# Patient Record
Sex: MALE | Race: WHITE | NOT HISPANIC OR LATINO | URBAN - METROPOLITAN AREA
[De-identification: names, ages, dates, MRNs, and addresses within clinical notes are randomized per-mention and may not be internally consistent; named-entity substitution may affect disease eponyms.]

---

## 2024-07-23 ENCOUNTER — CONSULT (OUTPATIENT)
Dept: ENDOCRINOLOGY | Facility: CLINIC | Age: 29
End: 2024-07-23
Payer: COMMERCIAL

## 2024-07-23 VITALS
BODY MASS INDEX: 28.2 KG/M2 | SYSTOLIC BLOOD PRESSURE: 135 MMHG | HEART RATE: 64 BPM | DIASTOLIC BLOOD PRESSURE: 86 MMHG | WEIGHT: 197 LBS | HEIGHT: 70 IN

## 2024-07-23 DIAGNOSIS — Z31.41 FERTILITY TESTING: ICD-10-CM

## 2024-07-23 DIAGNOSIS — Z11.3 ENCOUNTER FOR SCREENING EXAMINATION FOR SEXUALLY TRANSMITTED DISEASE: Primary | ICD-10-CM

## 2024-07-23 LAB
HBV SURFACE AG SERPL QL IA: NONREACTIVE
HCV AB SER QL: NONREACTIVE
HIV 1+2 AB+HIV1 P24 AG SERPL QL IA: NONREACTIVE
TREPONEMA PALLIDUM IGG+IGM AB [PRESENCE] IN SERUM OR PLASMA BY IMMUNOASSAY: NONREACTIVE

## 2024-07-23 PROCEDURE — 86780 TREPONEMA PALLIDUM: CPT

## 2024-07-23 PROCEDURE — 87591 N.GONORRHOEAE DNA AMP PROB: CPT

## 2024-07-23 PROCEDURE — 87491 CHLMYD TRACH DNA AMP PROBE: CPT

## 2024-07-23 PROCEDURE — 86803 HEPATITIS C AB TEST: CPT

## 2024-07-23 PROCEDURE — 87389 HIV-1 AG W/HIV-1&-2 AB AG IA: CPT

## 2024-07-23 PROCEDURE — 99202 OFFICE O/P NEW SF 15 MIN: CPT | Performed by: NURSE PRACTITIONER

## 2024-07-23 PROCEDURE — 99212 OFFICE O/P EST SF 10 MIN: CPT | Performed by: NURSE PRACTITIONER

## 2024-07-23 PROCEDURE — 87340 HEPATITIS B SURFACE AG IA: CPT

## 2024-07-23 PROCEDURE — 36415 COLL VENOUS BLD VENIPUNCTURE: CPT

## 2024-07-23 ASSESSMENT — PAIN SCALES - GENERAL: PAINLEVEL: 0-NO PAIN

## 2024-07-23 NOTE — PROGRESS NOTES
Visit Type: In Person    NEW FERTILITY PATIENT VISIT       Taras Wesley is a 29 y.o. here with partner Minerva for fertility assessment. They have been trying for 1 year.        Prior Labs  Lab Results    Date Done      AMH: No results found for requested labs within last 1825 days. No results found for requested labs within last 1825 days.   TSH: No results found for requested labs within last 1825 days. No results found for requested labs within last 1825 days.   PRL: No results found for requested labs within last 1825 days. No results found for requested labs within last 1825 days.   Testosterone: No results found for requested labs within last 1825 days. No results found for requested labs within last 1825 days.   DHEAS: No results found for requested labs within last 1825 days. No results found for requested labs within last 1825 days.   FSH: No results found for requested labs within last 1825 days. No results found for requested labs within last 1825 days.   17 OHP: No results found for requested labs within last 1825 days. No results found for requested labs within last 1825 days.   HgbA1c: No results found for requested labs within last 1825 days. No results found for requested labs within last 1825 days.   Hepatitis B surface antigen: No results found for requested labs within last 1825 days. No results found for requested labs within last 1825 days.   Hepatitis C antibody: No results found for requested labs within last 1825 days. No results found for requested labs within last 1825 days.   HIV ½ Antigen Antibody screen with reflex: No results found for requested labs within last 1825 days. No results found for requested labs within last 1825 days.   Syphilis screening with reflex: No results found for requested labs within last 1825 days. No results found for requested labs within last 1825 days.   GC: No results found for requested labs within last 1825 days. No results found for requested labs  within last 1825 days.   CT: No results found for requested labs within last 1825 days. No results found for requested labs within last 1825 days.   Type and Screen: No results found for requested labs within last 1825 days. No results found for requested labs within last 1825 days.   Rh: No results found for requested labs within last 1825 days. No results found for requested labs within last 1825 days.   Antibody: No results found for requested labs within last 1825 days. No results found for requested labs within last 1825 days.   Rubella: No results found for requested labs within last 1825 days. No results found for requested labs within last 1825 days.   Varicella: No results found for requested labs within last 1825 days. No results found for requested labs within last 1825 days.   Hemoglobin: No results found for requested labs within last 1825 days. No results found for requested labs within last 1825 days.   Hematocrit: No results found for requested labs within last 1825 days. No results found for requested labs within last 1825 days.   Creatinine: No results found for requested labs within last 1825 days. No results found for requested labs within last 1825 days.   AST:No results found for requested labs within last 1825 days. No results found for requested labs within last 1825 days.   ALT:No results found for requested labs within last 1825 days.: No results found for requested labs within last 1825 days.        PMH  Past Medical History:   Diagnosis Date    Concussion         MEDICATIONS  No current outpatient medications on file prior to visit.     No current facility-administered medications on file prior to visit.       PSH  Past Surgical History:   Procedure Laterality Date    WISDOM TOOTH EXTRACTION          Social History     Tobacco Use    Smoking status: Some Days     Types: Cigarettes     Passive exposure: Current    Smokeless tobacco: Never   Substance Use Topics    Alcohol use: Yes    Drug  "use: Yes     Types: Marijuana        PARTNER HISTORY    Partner Name: Partner name:: Taras Wesley    Partner : Partner :: [id number]    Partner email:    Occupation: Partner occupation:: Self employed    Prior fertility history: Partner Prior Fertility History:: None    PMH:   hx of concussions, kidney stones  PSH:  wisdom teeth  Smoking:Partner: Recent or current tobacco use: No    Alcohol Use: Partner: Recent or current alcohol use: Yes    Drug Use: Partner: Recent or current drug use: No    Medications: Partner Medications: None    Injuries: Partner: Any history of surgeries or injuries in the reproductive area?: No    STD: Have you ever been diagnosed with a sexually transmitted disease?: No    Please select all that are applicable:    SA: Prior Semen Analysis completed?: No    SA Results:  NA     FAMILY HISTORY   Family History   Problem Relation Name Age of Onset    Diabetes Maternal Grandmother      Epilepsy Other          half sister       BMI:   BMI Readings from Last 1 Encounters:   24 28.27 kg/m²     VITALS:  /86   Pulse 64   Ht 1.778 m (5' 10\")   Wt 89.4 kg (197 lb)   BMI 28.27 kg/m²   LMP: No LMP for male patient.    ASSESSMENT   29 y.o. here with partner for fertility assessment.     COUNSELING    INSTRUCTIONS FOR INFERTILITY TESTING    Semen Analysis  The semen samples that you have been asked to submit are important for diagnostic and therapeutic purposes.  Please abstain from ejaculating 2-3 days prior to collecting the sample.  The sample should be produced by masturbation.  You will need to collect the ejaculate into the container supplied by the [facility] (you can get containers at your doctor's office).  Do not use other plastic containers from home such as Tupperware as these containers as they may interfere with the test results.  Lubricants and saliva also interfere with test results.  If a collection condom is necessary, please request one at the [facility] lab " and they will provide you with an appropriate collection condom.  It is extremely important that the entire sample is collected in the container as the first few drops of ejaculate contain a major portion of sperm.  If you do not collect the entire specimen, please inform the technologist.      You can collect at home as long as you can get the sample to the [facility] lab within 1 hour of collecting.  Please bridget your name and time of collection on the container. You should carry the container close to your body.  Collection rooms are available at all locations as well.     An appointment is needed to ensure the lab has enough time to process your specimen.  Please call [phone] to schedule this appointment.       Routine Testing  Fertility Center  STDs Within 1 year   Genetic carrier Waiver/Completed   T&S Within 1 year   AMH Within 1 year   TSH Within 1 year   Rubella/Varicella Within 5 years     BMI Testing  Fertility Center  CBC Within 1 year   CMP Within 1 year   HgbA1c Within 1 year   Mag, Phos, Vit D <18 Within 1 year   MFM > 40  REQ   Wt loss consult > 40 OPT     PLAN  Orders Placed This Encounter   Procedures    Hepatitis B surface antigen    Hepatitis C antibody    HIV 1/2 Antigen/Antibody Screen with Reflex to Confirmation    Syphilis Screen with Reflex    C. Trachomatis / N. Gonorrhoeae, Amplified Detection    POCT Semen Analysis Complete with Strict Morphology       GENETIC SCREENING PATIENT  Waiver    PARTNER  Yes Semen Analysis: Ordered  Yes Genetic screening: Waiver    FOLLOW UP   Follow up with Ace Hensley  07/23/2024  9:30 AM

## 2024-07-24 LAB
C TRACH RRNA SPEC QL NAA+PROBE: NEGATIVE
N GONORRHOEA DNA SPEC QL PROBE+SIG AMP: NEGATIVE

## 2024-08-11 ENCOUNTER — HOSPITAL ENCOUNTER (OUTPATIENT)
Dept: RADIOLOGY | Facility: CLINIC | Age: 29
Discharge: HOME | End: 2024-08-11
Payer: COMMERCIAL

## 2024-08-11 DIAGNOSIS — S89.90XA KNEE INJURY, INITIAL ENCOUNTER: ICD-10-CM

## 2024-08-11 PROCEDURE — 73564 X-RAY EXAM KNEE 4 OR MORE: CPT | Mod: RIGHT SIDE | Performed by: RADIOLOGY

## 2024-08-11 PROCEDURE — 73564 X-RAY EXAM KNEE 4 OR MORE: CPT | Mod: RT

## 2024-08-13 ENCOUNTER — OFFICE VISIT (OUTPATIENT)
Dept: ORTHOPEDIC SURGERY | Facility: CLINIC | Age: 29
End: 2024-08-13
Payer: COMMERCIAL

## 2024-08-13 VITALS — WEIGHT: 197 LBS | BODY MASS INDEX: 28.2 KG/M2 | HEIGHT: 70 IN

## 2024-08-13 DIAGNOSIS — S89.91XA RIGHT KNEE INJURY, INITIAL ENCOUNTER: Primary | ICD-10-CM

## 2024-08-13 PROCEDURE — 3008F BODY MASS INDEX DOCD: CPT

## 2024-08-13 PROCEDURE — 99204 OFFICE O/P NEW MOD 45 MIN: CPT

## 2024-08-13 ASSESSMENT — PAIN - FUNCTIONAL ASSESSMENT: PAIN_FUNCTIONAL_ASSESSMENT: 0-10

## 2024-08-13 ASSESSMENT — PAIN SCALES - GENERAL: PAINLEVEL_OUTOF10: 4

## 2024-08-13 NOTE — PROGRESS NOTES
HPI  Taras Wesley is a 29 y.o. male  in office today for   Chief Complaint   Patient presents with    Right Knee - Edema, Pain     8/10/24 was doing a spin kick and landed on his knee it popped and he went down to the ground and was in immediate pain.   . Pain medial side and below patella.  he states that the knee feels very unstable with any lateral/medial movement.  OK straight ahead so long as he is wearing the brace he was given.  He has been limited in the amount of weight he can put on the knee.  He has been taking Tylenol/ibuprofen and icing for pain which are not helping at all.  He did see urgent care and they advised to follow up with ortho.      Medication  No current outpatient medications on file prior to visit.     No current facility-administered medications on file prior to visit.       Physical Exam  Constitutional: well developed, well nourished male in no acute distress  Psychiatric: normal mood, appropriate affect  Eyes: sclera anicteric  HENT: normocephalic/atraumatic  CV: regular rate and rhythm   Respiratory: non labored breathing  Integumentary: no rash  Neurological: moves all extremities    Right Knee Exam     Tenderness   The patient is experiencing tenderness in the medial joint line and patella.    Range of Motion   Extension:  -5   Flexion:  100     Tests   Moni:  Medial - positive Lateral - negative  Varus: negative Valgus: negative  Drawer:  Anterior - negative    Posterior - negative  Patellar apprehension: negative    Other   Erythema: absent  Scars: absent  Swelling: moderate  Effusion: effusion present              Imaging/Lab:  X-rays were taken 8/11/24 which were reviewed by myself and read by radiology and show No acute fracture or osseous displacement. Knee joint spaces are preserved. Moderate  suprapatellar effusion. Otherwise no focal soft tissue swelling is apparent.      Assessment  Assessment: right knee injury    Plan  Plan:  History, physical exam, and imaging  were reviewed with patient. Given PE, inability to bear weight, mechanism of injury ordering MRI to assess for meniscus and ACL injury.  MRI orders given.  In the meantime, should continue to keep weight off the knee (states he has crutches, but did not have in office), continue with RICE and antiinflammatories.  Follow Up: after MRI.  Will refer to Dr Alejandro as appropriate.    All questions were answered for the patient prior to end of exam and patient addressed their understanding.    Remedios Sales PA-C  08/13/24

## 2024-08-14 ENCOUNTER — HOSPITAL ENCOUNTER (OUTPATIENT)
Dept: RADIOLOGY | Facility: HOSPITAL | Age: 29
Discharge: HOME | End: 2024-08-14
Payer: COMMERCIAL

## 2024-08-14 DIAGNOSIS — S89.91XA RIGHT KNEE INJURY, INITIAL ENCOUNTER: ICD-10-CM

## 2024-08-14 PROCEDURE — 73721 MRI JNT OF LWR EXTRE W/O DYE: CPT | Mod: RT

## 2024-08-14 PROCEDURE — 73721 MRI JNT OF LWR EXTRE W/O DYE: CPT | Mod: RIGHT SIDE | Performed by: STUDENT IN AN ORGANIZED HEALTH CARE EDUCATION/TRAINING PROGRAM

## 2024-08-15 ENCOUNTER — TELEPHONE (OUTPATIENT)
Dept: ORTHOPEDIC SURGERY | Facility: CLINIC | Age: 29
End: 2024-08-15
Payer: COMMERCIAL

## 2024-08-15 NOTE — TELEPHONE ENCOUNTER
Spoke with patient regarding his MRI of his RT knee, he is referred to Dr Alejandro per miguelangel Sales PA-C

## 2024-08-19 ENCOUNTER — APPOINTMENT (OUTPATIENT)
Dept: ORTHOPEDIC SURGERY | Facility: CLINIC | Age: 29
End: 2024-08-19
Payer: COMMERCIAL

## 2024-08-19 VITALS — WEIGHT: 200 LBS | BODY MASS INDEX: 28.63 KG/M2 | HEIGHT: 70 IN

## 2024-08-19 DIAGNOSIS — S83.211A BUCKET HANDLE TEAR OF MEDIAL MENISCUS OF RIGHT KNEE, INITIAL ENCOUNTER: ICD-10-CM

## 2024-08-19 DIAGNOSIS — S83.241A ACUTE MEDIAL MENISCUS TEAR OF RIGHT KNEE, INITIAL ENCOUNTER: Primary | ICD-10-CM

## 2024-08-19 DIAGNOSIS — S89.91XA ACUTE INJURY OF ANTERIOR CRUCIATE LIGAMENT, RIGHT, INITIAL ENCOUNTER: ICD-10-CM

## 2024-08-19 PROCEDURE — 99215 OFFICE O/P EST HI 40 MIN: CPT | Performed by: ORTHOPAEDIC SURGERY

## 2024-08-19 PROCEDURE — 3008F BODY MASS INDEX DOCD: CPT | Performed by: ORTHOPAEDIC SURGERY

## 2024-08-19 ASSESSMENT — PAIN SCALES - GENERAL: PAINLEVEL_OUTOF10: 6

## 2024-08-19 ASSESSMENT — PAIN - FUNCTIONAL ASSESSMENT: PAIN_FUNCTIONAL_ASSESSMENT: 0-10

## 2024-08-19 NOTE — PROGRESS NOTES
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  The patient was doing a complex martial arts kick when he twisted the his old right knee and had severe pain.  He was seen by Remedios Sales ordered an MRI showing a significant injury of the right knee.  Patient complains of pain and instability in the right knee with an inability to extend the knee    Patients' self reported past medical history, medications, allergies, surgical history, family and social history as well as a 10 point review of systems has been documented in the new patient intake form and scanned into the patient's electronic medical record.  The intake form was reviewed by Dr Alejandro during the office visit and signed by Dr. Alejandro and the patient.  Pertinent findings are documented in the HPI.    General Multi-System Physical Exam:  Constitutional  General appearance:  Alert, oriented, and in no acute distress.  Well developed, well nourished.  Head and Face  Head and face:  Normocephalic and atraumatic.  Ears, Nose, Mouth, and Throat  External inspection of ears and nose: Normal.  Eyes:  Pupils are equal and round.  Neck  Neck:  no neck mass was observed.  Pulmonary  Respiratory effort:  no respiratory distress.  Cardiovascular  Intact distal pulses.  Lymphatic  Palpation of lymph nodes in the affected extremity:  Normal.  Skin  Skin and subcutaneous tissue:  Normal skin color and pigmentation.  Normal skin turgor.  No rashes.  Neurologic  Sensation:  normal to light touch.  Psychiatric  Judgement and insight:  Intact.  Mood and affect:  Normal.  Musculoskeletal  Right knee range of motion is from 20 degrees of flexion to 110 degrees of flexion with a mild effusion positive medial joint line tenderness negative lateral joint line tenderness positive Lachman positive anterior drawer patient guards her pivot shift testing knee  stable to varus and valgus stress negative posterior drawer neurovasc intact right lower extremity    Dr Alejandro personally reviewed the results of the x-rays that had been performed recently on this patient.    In addition, Dr Alejandro independently interpreted the patient's x-rays (performed by the Radiology department) by viewing the x-ray images and this is Dr. Alejandro's personal interpretation:     Normal x-rays right knee    Dr Alejandro independently interpreted the patient's MRI (performed by the Radiology department) by viewing the MRI images and this is Dr. Alejandro's personal interpretation:     Right knee with ACL rupture as well as medial meniscus bucket-handle tear displaced in the notch and posterior root medial meniscus tear        We had an extensive discussion regarding the patient's ACL rupture.  I explained to the patient that no one absolutely has to have their ACL reconstructed.  An ACL is not necessary for inline activities, for example walking, jogging, or going up and down stairs.  An ACL also is not necessary for casual recreational activities where a return-to-sport brace can be used which can help prevent subluxations of the knee.  However, studies have shown that bracing is not effective in aggressive cutting and pivoting sports.  I explained to the patient that if they wanted to return to aggressive cutting and pivoting sports, an ACL reconstruction is usually recommended before returning to this high level of activity.  We discussed the fact that if the patient's knee does continue to sublux, they have an increased risk of damaging the meniscus, cartilage and other structures in the knee.  However, no studies have been able to prove a decreased risk of long-term arthritis as a result of ACL reconstruction.  Whether the patient has an ACL reconstruction or not, they are at an increased risk of arthritis down the road, and this was explained to the patient at length.  We also discussed the graft  options including bone-tendon-bone autograft, hamstring autograft and allograft.  We discussed the benefits of the bone-tendon-bone being that it might heal in slightly faster; however, all grafts have been shown to be equivalent in strength in long-term followup studies.  I also explained to the patient that no matter what graft they select, we will not let them return back to aggressive cutting or pivoting sports in less than 6 or 7 months with a plan to start jogging at 3 months, running at 4 months, cutting and pivoting drills at 5 months and return to aggressive sports at 6 or 7 months.  We did discuss that bone-tendon-bone autograft have a longer incision over the front of the knee, so the patient has some abnormal sensations when they try kneeling on the knee for the rest of their life.  They may also have some numbness over the front of the knee from the incision.  In addition, there is an increased risk of anterior knee pain, which usually goes away, but it may take as long as 6 months to go away after bone-tendon-bone autograft.  With bone-tendon-bone autografts, there is also an increased risk of patellar fracture, although unlikely.  We discussed hamstring autografts and the fact that while the incision is smaller than a bone-tendon-bone autograft, it is larger than an allograft.  The incision is more over the anteromedial aspect of the knee.  Again, they may have some numbness around their incision after surgery, and they have some soreness in the hamstring muscles after surgery, which usually goes away with time, but can occasionally continue long after surgery.  Studies have shown no more than a 5%-10% loss in hamstring strength in long-term followup studies.  Lastly, we discussed the possibility of allograft for the ACL.  The benefits of the allograft are the smallest incision possible and no need to harvest any tissue from their leg, so the patient is most comfortable after surgery.  However, we did  discuss the risks of disease transmission including a risk of HIV at 1 in 1.5 million and risk of hepatitis C in approximately 1 in 150,000.  We also discussed the fact that there is a risk of disease transmission for diseases that are not yet diagnosed and, therefore, cannot be screened for.  We also discussed the fact that in patients under 25 years old, allografts are usually not recommended due to the high level of forces that the patients are going to put on their knee throughout their life.  With patients under the age of 25, we discussed how autografts are usually recommended.  However, it would be acceptable to use an allograft in someone under the age of 25 if they understood the risks vs. benefits.  We also discussed all of the risks vs. benefits of ACL reconstruction including but not limited to infection, bleeding, nerve, artery or muscle damage, possible need for additional surgery, possible need for removal of hardware, and possible continued pain or laxity in the knee.  Primary ACL reconstructions are approximately 90% effective at getting patients back to doing what they want to do.  However, some patients may require revisions or future surgeries.  The patient understood all of these risks and benefits of ACL reconstruction and all graft options.  I answered all the patient's questions.    We had a very long discussion regards to his right knee.  Due to his high level of activity and combat martial arts, the patient would like to proceed with a right knee ACL reconstruction with patellar tendon autograft with allograft backup medial meniscus repair if possible and debridement if not possible.  He would like to proceed with surgery on September 5 and signed appropriate surgical consents.  Will plan on seeing him back for surgery sooner if necessary        I, Dr. Alejandro, had a long discussion with the patient / patient's family regarding the risks versus benefits of surgery and all of the treatment  options, including nonoperative treatment and surgical treatment options. We discussed the risks of surgery.      The risks of surgery include, but are not limited to, nerve damage, artery damage, muscle damage, risk of bleeding or infection, risk of bone fracture, risk of post-operative stiffness, risk of failure of the surgery with possible continued pain or possible need for additional surgery.  Other risks include the risk of hardware pain and possible need for removal of the surgical hardware at another time.   The risks of undergoing anesthesia including, but are not limited to, stroke, heart attack or death.      After a long discussion, the patient / patient's family understood all of the risks versus benefits of surgery and decided that they wanted to proceed with surgery. The patient / guardian signed appropriate surgical consent forms.    Since this patient will be undergoing surgery, I expect the patient to be in significant additional pain in the immediate postoperative period as a result of the surgical procedure. Non-opioid pain medications will not be sufficient to treat this acute, sharp, postoperative pain. Therefore we will be prescribing the patient narcotic pain medications for the immediate postoperative period in addition to numerous non-narcotic pain medications in order to try to help the patient with their post-operative pain.  However, as the pain from surgery subsides, we will work diligently to wean the patient off of all narcotics as quickly as possible.   If the patient requires more narcotic pain medications than we typically see with patients undergoing this surgery, we will refer the patient to a pain management specialist within the first few weeks after their surgical procedure. The patient's OARRS report was reviewed within the last 90 days.        This patient has had a major injury to their affected extremity which has significantly traumatized it.  If surgery is not performed  to repair the extremity, it is unlikely the patient's extremity would ever function normally again.  Non-operative treatment would probably fail, leaving this patient with significant disability.  That would classify this problem as an acute injury that poses a threat to the function of the patient's extremity.     We discussed their surgery at great length.  This is an elective major surgery which is warranted in this case due to the patient's severely injured extremity.  We discussed identified patient and procedural risk factors and how these risk factors may increase the risk of the surgery or influence the outcome of the surgical procedure.   We also discussed how delaying surgery and treating this injury non-operatively may lead to a progression of the injury/disease and high risk of further morbidity.

## 2024-08-27 ENCOUNTER — TELEPHONE (OUTPATIENT)
Dept: ORTHOPEDIC SURGERY | Facility: CLINIC | Age: 29
End: 2024-08-27
Payer: COMMERCIAL

## 2024-08-29 ENCOUNTER — PRE-ADMISSION TESTING (OUTPATIENT)
Dept: PREADMISSION TESTING | Facility: HOSPITAL | Age: 29
End: 2024-08-29
Payer: COMMERCIAL

## 2024-08-29 VITALS
WEIGHT: 196.21 LBS | DIASTOLIC BLOOD PRESSURE: 89 MMHG | SYSTOLIC BLOOD PRESSURE: 144 MMHG | BODY MASS INDEX: 28.09 KG/M2 | OXYGEN SATURATION: 100 % | HEART RATE: 60 BPM | TEMPERATURE: 97.2 F | HEIGHT: 70 IN | RESPIRATION RATE: 18 BRPM

## 2024-08-29 DIAGNOSIS — S83.211A BUCKET HANDLE TEAR OF MEDIAL MENISCUS OF RIGHT KNEE, INITIAL ENCOUNTER: ICD-10-CM

## 2024-08-29 DIAGNOSIS — S83.241A ACUTE MEDIAL MENISCUS TEAR OF RIGHT KNEE, INITIAL ENCOUNTER: ICD-10-CM

## 2024-08-29 DIAGNOSIS — Z01.818 PREOPERATIVE EXAMINATION: Primary | ICD-10-CM

## 2024-08-29 DIAGNOSIS — S89.91XA ACUTE INJURY OF ANTERIOR CRUCIATE LIGAMENT, RIGHT, INITIAL ENCOUNTER: ICD-10-CM

## 2024-08-29 LAB
ALBUMIN SERPL BCP-MCNC: 4.3 G/DL (ref 3.4–5)
ALP SERPL-CCNC: 45 U/L (ref 33–120)
ALT SERPL W P-5'-P-CCNC: 20 U/L (ref 10–52)
ANION GAP SERPL CALC-SCNC: 11 MMOL/L (ref 10–20)
AST SERPL W P-5'-P-CCNC: 14 U/L (ref 9–39)
BASOPHILS # BLD AUTO: 0.05 X10*3/UL (ref 0–0.1)
BASOPHILS NFR BLD AUTO: 0.7 %
BILIRUB SERPL-MCNC: 0.5 MG/DL (ref 0–1.2)
BUN SERPL-MCNC: 16 MG/DL (ref 6–23)
CALCIUM SERPL-MCNC: 9.2 MG/DL (ref 8.6–10.3)
CHLORIDE SERPL-SCNC: 103 MMOL/L (ref 98–107)
CO2 SERPL-SCNC: 27 MMOL/L (ref 21–32)
CREAT SERPL-MCNC: 1.01 MG/DL (ref 0.5–1.3)
EGFRCR SERPLBLD CKD-EPI 2021: >90 ML/MIN/1.73M*2
EOSINOPHIL # BLD AUTO: 0.08 X10*3/UL (ref 0–0.7)
EOSINOPHIL NFR BLD AUTO: 1.2 %
ERYTHROCYTE [DISTWIDTH] IN BLOOD BY AUTOMATED COUNT: 12.2 % (ref 11.5–14.5)
GLUCOSE SERPL-MCNC: 100 MG/DL (ref 74–99)
HCT VFR BLD AUTO: 46.3 % (ref 41–52)
HGB BLD-MCNC: 16.1 G/DL (ref 13.5–17.5)
IMM GRANULOCYTES # BLD AUTO: 0.01 X10*3/UL (ref 0–0.7)
IMM GRANULOCYTES NFR BLD AUTO: 0.1 % (ref 0–0.9)
LYMPHOCYTES # BLD AUTO: 2.02 X10*3/UL (ref 1.2–4.8)
LYMPHOCYTES NFR BLD AUTO: 29.1 %
MCH RBC QN AUTO: 30.4 PG (ref 26–34)
MCHC RBC AUTO-ENTMCNC: 34.8 G/DL (ref 32–36)
MCV RBC AUTO: 87 FL (ref 80–100)
MONOCYTES # BLD AUTO: 0.52 X10*3/UL (ref 0.1–1)
MONOCYTES NFR BLD AUTO: 7.5 %
NEUTROPHILS # BLD AUTO: 4.25 X10*3/UL (ref 1.2–7.7)
NEUTROPHILS NFR BLD AUTO: 61.4 %
NRBC BLD-RTO: 0 /100 WBCS (ref 0–0)
PLATELET # BLD AUTO: 250 X10*3/UL (ref 150–450)
POTASSIUM SERPL-SCNC: 4.3 MMOL/L (ref 3.5–5.3)
PROT SERPL-MCNC: 7 G/DL (ref 6.4–8.2)
RBC # BLD AUTO: 5.3 X10*6/UL (ref 4.5–5.9)
SODIUM SERPL-SCNC: 137 MMOL/L (ref 136–145)
WBC # BLD AUTO: 6.9 X10*3/UL (ref 4.4–11.3)

## 2024-08-29 PROCEDURE — 99204 OFFICE O/P NEW MOD 45 MIN: CPT | Performed by: NURSE PRACTITIONER

## 2024-08-29 PROCEDURE — 36415 COLL VENOUS BLD VENIPUNCTURE: CPT

## 2024-08-29 PROCEDURE — 84075 ASSAY ALKALINE PHOSPHATASE: CPT

## 2024-08-29 PROCEDURE — 85025 COMPLETE CBC W/AUTO DIFF WBC: CPT

## 2024-08-29 RX ORDER — IBUPROFEN 200 MG
200 TABLET ORAL EVERY 6 HOURS
COMMUNITY
End: 2024-09-05 | Stop reason: HOSPADM

## 2024-08-29 ASSESSMENT — ENCOUNTER SYMPTOMS
MUSCULOSKELETAL NEGATIVE: 1
NUMBNESS: 0
FEVER: 0
RESPIRATORY NEGATIVE: 1
LIGHT-HEADEDNESS: 0
NECK NEGATIVE: 1
DYSPNEA WITH EXERTION: 0
SHORTNESS OF BREATH: 0
WEAKNESS: 0
NEUROLOGICAL NEGATIVE: 1
PALPITATIONS: 0
GASTROINTESTINAL NEGATIVE: 1
WHEEZING: 0
COUGH: 0
CHILLS: 0
EYES NEGATIVE: 1
CARDIOVASCULAR NEGATIVE: 1

## 2024-08-29 ASSESSMENT — DUKE ACTIVITY SCORE INDEX (DASI)
CAN YOU TAKE CARE OF YOURSELF (EAT, DRESS, BATHE, OR USE TOILET): YES
CAN YOU HAVE SEXUAL RELATIONS: YES
CAN YOU WALK INDOORS, SUCH AS AROUND YOUR HOUSE: YES
CAN YOU RUN A SHORT DISTANCE: NO
CAN YOU DO YARD WORK LIKE RAKING LEAVES, WEEDING OR PUSHING A MOWER: NO
DASI METS SCORE: 7.4
TOTAL_SCORE: 37.7
CAN YOU DO MODERATE WORK AROUND THE HOUSE LIKE VACUUMING, SWEEPING FLOORS OR CARRYING GROCERIES: YES
CAN YOU DO HEAVY WORK AROUND THE HOUSE LIKE SCRUBBING FLOORS OR LIFTING AND MOVING HEAVY FURNITURE: NO
CAN YOU WALK A BLOCK OR TWO ON LEVEL GROUND: YES
CAN YOU DO LIGHT WORK AROUND THE HOUSE LIKE DUSTING OR WASHING DISHES: YES
CAN YOU PARTICIPATE IN STRENOUS SPORTS LIKE SWIMMING, SINGLES TENNIS, FOOTBALL, BASKETBALL, OR SKIING: YES
CAN YOU PARTICIPATE IN MODERATE RECREATIONAL ACTIVITIES LIKE GOLF, BOWLING, DANCING, DOUBLES TENNIS OR THROWING A BASEBALL OR FOOTBALL: YES
CAN YOU CLIMB A FLIGHT OF STAIRS OR WALK UP A HILL: YES

## 2024-08-29 ASSESSMENT — ACTIVITIES OF DAILY LIVING (ADL): ADL_SCORE: 0

## 2024-08-29 ASSESSMENT — LIFESTYLE VARIABLES: SMOKING_STATUS: NONSMOKER

## 2024-08-29 NOTE — CPM/PAT H&P
CPM/PAT Evaluation       Name: Taras Wesley (Taras Wesley)  /Age: 1995/29 y.o.     Visit Type:   In-Person       Chief Complaint: medical meniscus of right knee    HPI 28 y/o male scheduled for arthroscopy of right knee on 2024 with  Dr. Alejandro secondary to medical meniscus of right knee.  No significant PMHX.  PAT is consulted today for perioperative risk stratification and optimization.      Past Medical History:   Diagnosis Date    Concussion     Concussion        Past Surgical History:   Procedure Laterality Date    WISDOM TOOTH EXTRACTION         Patient  has no history on file for sexual activity.    Family History   Problem Relation Name Age of Onset    Diabetes Maternal Grandmother      Epilepsy Other          half sister       No Known Allergies    Prior to Admission medications    Medication Sig Start Date End Date Taking? Authorizing Provider   acetaminophen (Tylenol) 325 mg suppository Insert into the rectum.    Historical Provider, MD   ibuprofen 200 mg tablet Take 1 tablet (200 mg) by mouth every 6 hours.    Historical Provider, MD CASTANEDA ROS:   Constitutional:    no fever   no chills  Neuro/Psych:   neg     no numbness   no weakness   no light-headedness  Eyes:   neg    Ears:   neg    Nose:   Mouth:   Throat:   neg    Neck:   neg    Cardio:   neg     no chest pain   no palpitations   no PAGE  Respiratory:   neg     no cough   no wheezing   no shortness of breath  Endocrine:   GI:   neg    :   neg    Musculoskeletal:    Crutch - for out the house  neg    Hematologic:   neg    Skin:      Physical Exam  Vitals reviewed.   Constitutional:       Appearance: Normal appearance.   HENT:      Mouth/Throat:      Mouth: Mucous membranes are moist.      Pharynx: Oropharynx is clear.   Eyes:      Pupils: Pupils are equal, round, and reactive to light.   Cardiovascular:      Rate and Rhythm: Normal rate and regular rhythm.      Pulses: Normal pulses.      Heart sounds: Normal heart sounds.    Pulmonary:      Effort: Pulmonary effort is normal.      Breath sounds: Normal breath sounds.   Abdominal:      General: Bowel sounds are normal.      Palpations: Abdomen is soft.   Musculoskeletal:         General: Normal range of motion.      Cervical back: Normal range of motion and neck supple.   Skin:     General: Skin is warm and dry.   Neurological:      General: No focal deficit present.      Mental Status: He is alert and oriented to person, place, and time.   Psychiatric:         Mood and Affect: Mood normal.         Behavior: Behavior normal.          PAT AIRWAY:   Airway:     Mallampati::  III    Neck ROM::  Full  normal        Visit Vitals  /89   Pulse 60   Temp 36.2 °C (97.2 °F) (Temporal)   Resp 18       DASI Risk Score      Flowsheet Row Most Recent Value   DASI SCORE 37.7   METS Score (Will be calculated only when all the questions are answered) 7.4          Caprini DVT Assessment      Flowsheet Row Most Recent Value   DVT Score 8   Current Status Major surgery planned, including arthroscopic and laproscopic (1-2 hours), Elective major lower extremity arthroplasty   Age Less than 40 years   BMI 30 or less          Modified Frailty Index    No data to display       CHADS2 Stroke Risk         N/A 3 to 100%: High Risk   2 to < 3%: Medium Risk   0 to < 2%: Low Risk     Last Change: N/A          This score determines the patient's risk of having a stroke if the patient has atrial fibrillation.        This score is not applicable to this patient. Components are not calculated.          Revised Cardiac Risk Index    No data to display       Apfel Simplified Score    No data to display       Risk Analysis Index Results This Encounter         8/29/2024  0842             MCCALL Cancer History: Patient does not indicate history of cancer    Total Risk Analysis Index Score Without Cancer: 10    Total Risk Analysis Index Score: 10          Stop Bang Score      Flowsheet Row Most Recent Value   Do you snore  loudly? 0   Do you often feel tired or fatigued after your sleep? 0   Has anyone ever observed you stop breathing in your sleep? 0   Do you have or are you being treated for high blood pressure? 0   Recent BMI (Calculated) 28.7   Is BMI greater than 35 kg/m2? 0=No   Age older than 50 years old? 0=No   Is your neck circumference greater than 17 inches (Male) or 16 inches (Female)? 0   Gender - Male 1=Yes   STOP-BANG Total Score 1            Assessment and Plan:     HPI 30 y/o male scheduled for arthroscopy of right knee on 9/5/2024 with  Dr. Alejandro secondary to medical meniscus of right knee.  No significant PMHX.  PAT is consulted today for perioperative risk stratification and optimization.      Neuro:  No neurologic diagnosis or significant findings on chart review, clinical presentation and evaluation.  No grossly apparent neurologic perioperative risk.    Patient is not at increased risk for perioperative CVA      HEENT:  No HEENT diagnosis or significant findings on chart review or clinical presentation and evaluation. No further preoperative testing/intervention indicated at this time.    Cardiovascular:  No CV diagnosis or significant findings on chart review or clinical presentation and evaluation. No further preoperative testing or intervention is indicated at this time.  METS: 7.4  RCRI: 0 points, 3.9%  risk for postoperative MACE   JEFFRY: 0.0% risk for 30 day postoperative MACE    Pulmonary:  No pulmonary diagnosis or significant findings on chart review or clinical presentation.  No further preoperative testing is indicated at this time.  Stop Bang score is 1 placing patient at low risk for ENRIQUETA  ARISCAT: <26 points, 1.6% risk of in-hospital postoperative pulmonary complication  PRODIGY: Low risk for opioid induced respiratory depression      Renal:   No renal diagnosis or significant findings on chart review, clinical presentation or evaluation. No further preoperative testing/intervention is indicated at  this time.    Endocrine:  No endocrine diagnosis or significant findings on chart review or clinical presentation and evaluation. No further testing or intervention is indicated at this time.    Hematologic:  No hematologic diagnosis, however patient is at an increased risk for DVT  Caprini Score 8, patient at High risk for perioperative DVT.  Patient provided with VTE education/handout.    Gastrointestinal:   No GI diagnosis or significant findings on chart review or clinical presentation and evaluation.   Apfel 2    Orthopaedic Surgery  Follows with Dr Alejandro.  Last seen 8/19/2024 for medial meniscus tear  Plan for ACL reconstruction    Infectious disease:   No infectious diagnosis or significant findings on chart review or clinical presentation and evaluation.     Musculoskeletal:   No diagnosis or significant findings on chart review or clinical presentation and evaluation.     Anesthesia/Airway:  No prior anesthesia      Medication instructions and NPO guidelines reviewed with the patient.  All questions or concerns discussed and addressed.  Holding ibuprofen 1 week prior to procedure    Labs ordered

## 2024-08-29 NOTE — PREPROCEDURE INSTRUCTIONS
Thank you for visiting Preadmission Testing (PAT) today for your pre-procedure evaluation, you were seen by     Jen Kim CNP  Pre Admission Testing  [facility]  [phone]    This summary includes instructions and information to aid you during your perioperative period.  Please read carefully. If you have any questions about your visit today, please call the number listed above.  If you become ill or have any changes to your health before your surgery, please contact your primary care provider and alert your surgeon.    Preparing for your Surgery       Exercises  Preoperative Deep Breathing Exercises  Why it is important to do deep breathing exercises before my surgery?  Deep breathing exercises strengthen your breathing muscles.  This helps you to recover after your surgery and decreases the chance of breathing complications.  How are the deep breathing exercises done?  Sit straight with your back supported.  Breathe in deeply and slowly through your nose. Your lower rib cage should expand and your abdomen may move forward.  Hold that breath for 3 to 5 seconds.  Breathe out through pursed lips, slowly and completely.  Rest and repeat 10 times every hour while awake.  Rest longer if you become dizzy or lightheaded.       Preoperative Brain Exercises    What are brain exercises?  A brain exercise is any activity that engages your thinking (cognitive) skills.    What types of activities are considered brain exercises?  Jigsaw puzzles, crossword puzzles, word jumble, memory games, word search, and many more.  Many can be found free online or on your phone via a mobile elmer.    Why should I do brain exercises before my surgery?  More recent research has shown brain exercise before surgery can lower the risk of postoperative delirium (confusion) which can be especially important for older adults.  Patients who did brain exercises for 5 to 10 hours the days before surgery, cut their risk  of postoperative delirium in half up to 1 week after surgery.    Sit-to-Stand Exercise    What is the sit-to-stand exercise?  The sit-to-stand exercise strengthens the muscles of your lower body and muscles in the center of your body (core muscles for stability) helping to maintain and improve your strength and mobility.  How do I do the sit-to-stand exercise?  The goal is to do this exercise without using your arms or hands.  If this is too difficult, use your arms and hands or a chair with armrests to help slowly push yourself to the standing position and lower yourself back to the sitting position. As the movement becomes easier use your arms and hands less.    Steps to the sit-to-stand exercise  Sit up tall in a sturdy chair, knees bent, feet flat on the floor shoulder-width apart.  Shift your hips/pelvis forward in the chair to correctly position yourself for the next movement.  Lean forward at your hips.  Stand up straight putting equal weight on both feet.  Check to be sure you are properly aligned with the chair, in a slow controlled movement sit back down.  Repeat this exercise 10-15 times.  If needed you can do it fewer times until your strength improves.  Rest for 1 minute.  Do another 10-15 sit-to-stand exercises.  Try to do this in the morning and evening.        Instructions    Preoperative Fasting Guidelines    Why must I stop eating and drinking near surgery time?  With sedation, food or liquid in your stomach can enter your lungs causing serious complications  Food can increase nausea and vomiting  When do I need to stop eating and drinking before my surgery?      Do not eat any food after midnight the night before your surgery/procedure. You may have up to 13.5 ounces of clear liquid until TWO hours before your instructed arrival time to the hospital.  This includes water, black tea/coffee, (no milk or cream) apple juice, and electrolyte drinks (Gatorade). You may chew gum until TWO hours before  your surgery/procedure        Simple things you can do to help prevent blood clots     Blood clots are blockages that can form in the body's veins. When a blood clot forms in your deep veins, it may be called a deep vein thrombosis, or DVT for short. Blood clots can happen in any part of the body where blood flows, but they are most common in the arms and legs. If a piece of a blood clot breaks free and travels to the lungs, it is called a pulmonary embolus (PE). A PE can be a very serious problem.         Being in the hospital or having surgery can raise your chances of getting a blood clot because you may not be well enough to move around as much as you normally do.         Ways you can help prevent blood clots in the hospital       Wearing SCDs  SCDs stands for Sequential Compression Devices.   SCDs are special sleeves that wrap around your legs. They attach to a pump that fills them with air to gently squeeze your legs every few minutes.  This helps return the blood in your legs to your heart.   SCDs should only be taken off when walking or bathing. SCDs may not be comfortable, but they can help save your life.              Pump SCD leg sleeves  Wearing compression stockings - if your doctor orders them. These special snug-fitting stockings gently squeeze your legs to help blood flow.       Walking. Walking helps move the blood in your legs.   If your doctor says it is ok, try walking the halls at least   5 times a day. Ask us to help you get up, so you don't fall.      Taking any blood-thinning medicines your doctor orders.              Ways you can help prevent blood clots at home         Wearing compression stockings - if your doctor orders them.   Walking - to help move the blood in your legs.    Taking any blood-thinning medicines your doctor orders.      Signs of a blood clot or PE    Tell your doctor or nurse right away if you have any of the problems listed below.         If you are at home, seek medical  care right away. Call 911 for chest pain or problems breathing.            Signs of a blood clot (DVT) - such as pain, swelling, redness, or warmth in your arm or legs.  Signs of a pulmonary embolism (PE) - such as chest pain or feeling short of breath      Tobacco and Alcohol;  Do not drink alcohol or smoke within 24 hours of surgery.  It is best to quit smoking for as long as possible before any surgery or procedure.          The Week before Surgery        Seven days before Surgery  Check your PAT medication instructions  Do the exercises provided to you by PAT  Arrange for a responsible, adult licensed  to take you home after surgery and stay with you for 24 hours.  You will not be permitted to drive yourself home if you have received any anesthetic/sedation  Six days before surgery  Check your PAT medication instructions  Do the exercises provided to you by PAT  Start using Chlorhexidene (CHG) body wash if prescribed  Five days before surgery  Check your PAT medication instructions  Do the exercises provided to you by PAT  Continue to use CHG body wash if prescribed  Three days before surgery  Check your PAT medication instructions  Do the exercises provided to you by PAT  Continue to use CHG body wash if prescribed  Two days before surgery  Check your PAT medication instructions  Do the exercises provided to you by PAT  Continue to use CHG body wash if prescribed    The Day before Surgery       Check your PAT medication and all other PAT instructions including when to stop eating and drinking  You will be called with your arrival time for surgery in the late afternoon.  If you do not receive a call please reach out to [facility]. [phone]  Do not smoke or drink 24 hours before surgery  Prepare items to bring with you to the hospital  Shower with your chlorhexidine wash if prescribed  Brush your teeth and use your chlorhexidine dental rinse if prescribed    The Day of Surgery       Check your PAT  medication instructions  Ensure you follow the instructions for when to stop eating and drinking  Shower, if prescribed use CHG.  Do not apply any lotions, creams, moisturizers, perfume or deodorant  Brush your teeth and use your CHG dental rinse if prescribed  Wear loose comfortable clothing  Avoid make-up  Remove  jewelry and piercings, consider professional piercing removal with a plastic spacer if needed  Bring photo ID and Insurance card  Bring an accurate medication list that includes medication dose, frequency and allergies  Bring a copy of your advanced directives (will, health care power of )  Bring any devices and controllers as well as medical devices you have been provided with for surgery (CPAP, slings, braces, etc.)  Dentures, eyeglasses, and contacts will be removed before surgery, please bring cases for contacts or glasses

## 2024-09-04 ENCOUNTER — ANESTHESIA EVENT (OUTPATIENT)
Dept: OPERATING ROOM | Facility: HOSPITAL | Age: 29
End: 2024-09-04
Payer: COMMERCIAL

## 2024-09-05 ENCOUNTER — PHARMACY VISIT (OUTPATIENT)
Dept: PHARMACY | Facility: CLINIC | Age: 29
End: 2024-09-05
Payer: COMMERCIAL

## 2024-09-05 ENCOUNTER — HOSPITAL ENCOUNTER (OUTPATIENT)
Facility: HOSPITAL | Age: 29
Setting detail: OUTPATIENT SURGERY
Discharge: HOME | End: 2024-09-05
Attending: ORTHOPAEDIC SURGERY | Admitting: ORTHOPAEDIC SURGERY
Payer: COMMERCIAL

## 2024-09-05 ENCOUNTER — ANESTHESIA (OUTPATIENT)
Dept: OPERATING ROOM | Facility: HOSPITAL | Age: 29
End: 2024-09-05
Payer: COMMERCIAL

## 2024-09-05 VITALS
SYSTOLIC BLOOD PRESSURE: 130 MMHG | TEMPERATURE: 97.7 F | OXYGEN SATURATION: 98 % | RESPIRATION RATE: 15 BRPM | DIASTOLIC BLOOD PRESSURE: 79 MMHG | BODY MASS INDEX: 28.44 KG/M2 | HEART RATE: 75 BPM | WEIGHT: 198.63 LBS | HEIGHT: 70 IN

## 2024-09-05 DIAGNOSIS — S83.241A ACUTE MEDIAL MENISCUS TEAR OF RIGHT KNEE, INITIAL ENCOUNTER: ICD-10-CM

## 2024-09-05 DIAGNOSIS — S89.91XA ACUTE INJURY OF ANTERIOR CRUCIATE LIGAMENT, RIGHT, INITIAL ENCOUNTER: ICD-10-CM

## 2024-09-05 DIAGNOSIS — S83.211A BUCKET HANDLE TEAR OF MEDIAL MENISCUS OF RIGHT KNEE, INITIAL ENCOUNTER: Primary | ICD-10-CM

## 2024-09-05 PROCEDURE — 2500000004 HC RX 250 GENERAL PHARMACY W/ HCPCS (ALT 636 FOR OP/ED)

## 2024-09-05 PROCEDURE — 29882 ARTHRS KNE SRG MNISC RPR M/L: CPT | Performed by: ORTHOPAEDIC SURGERY

## 2024-09-05 PROCEDURE — 2720000007 HC OR 272 NO HCPCS: Performed by: ORTHOPAEDIC SURGERY

## 2024-09-05 PROCEDURE — 3600000004 HC OR TIME - INITIAL BASE CHARGE - PROCEDURE LEVEL FOUR: Performed by: ORTHOPAEDIC SURGERY

## 2024-09-05 PROCEDURE — 29888 ARTHRS AID ACL RPR/AGMNTJ: CPT | Performed by: ORTHOPAEDIC SURGERY

## 2024-09-05 PROCEDURE — RXMED WILLOW AMBULATORY MEDICATION CHARGE

## 2024-09-05 PROCEDURE — 2780000003 HC OR 278 NO HCPCS: Performed by: ORTHOPAEDIC SURGERY

## 2024-09-05 PROCEDURE — 2500000004 HC RX 250 GENERAL PHARMACY W/ HCPCS (ALT 636 FOR OP/ED): Performed by: ANESTHESIOLOGY

## 2024-09-05 PROCEDURE — C1776 JOINT DEVICE (IMPLANTABLE): HCPCS | Performed by: ORTHOPAEDIC SURGERY

## 2024-09-05 PROCEDURE — 7100000010 HC PHASE TWO TIME - EACH INCREMENTAL 1 MINUTE: Performed by: ORTHOPAEDIC SURGERY

## 2024-09-05 PROCEDURE — 7100000002 HC RECOVERY ROOM TIME - EACH INCREMENTAL 1 MINUTE: Performed by: ORTHOPAEDIC SURGERY

## 2024-09-05 PROCEDURE — C1713 ANCHOR/SCREW BN/BN,TIS/BN: HCPCS | Performed by: ORTHOPAEDIC SURGERY

## 2024-09-05 PROCEDURE — 7100000009 HC PHASE TWO TIME - INITIAL BASE CHARGE: Performed by: ORTHOPAEDIC SURGERY

## 2024-09-05 PROCEDURE — 3600000009 HC OR TIME - EACH INCREMENTAL 1 MINUTE - PROCEDURE LEVEL FOUR: Performed by: ORTHOPAEDIC SURGERY

## 2024-09-05 PROCEDURE — 7100000001 HC RECOVERY ROOM TIME - INITIAL BASE CHARGE: Performed by: ORTHOPAEDIC SURGERY

## 2024-09-05 PROCEDURE — 3700000001 HC GENERAL ANESTHESIA TIME - INITIAL BASE CHARGE: Performed by: ORTHOPAEDIC SURGERY

## 2024-09-05 PROCEDURE — 3700000002 HC GENERAL ANESTHESIA TIME - EACH INCREMENTAL 1 MINUTE: Performed by: ORTHOPAEDIC SURGERY

## 2024-09-05 PROCEDURE — 2500000005 HC RX 250 GENERAL PHARMACY W/O HCPCS

## 2024-09-05 DEVICE — IMPLANTABLE DEVICE: Type: IMPLANTABLE DEVICE | Site: KNEE | Status: FUNCTIONAL

## 2024-09-05 DEVICE — KIT, DISPOSABLE, ANATOMIC ACL: Type: IMPLANTABLE DEVICE | Site: KNEE | Status: NON-FUNCTIONAL

## 2024-09-05 RX ORDER — FENTANYL CITRATE 50 UG/ML
INJECTION, SOLUTION INTRAMUSCULAR; INTRAVENOUS AS NEEDED
Status: DISCONTINUED | OUTPATIENT
Start: 2024-09-05 | End: 2024-09-05

## 2024-09-05 RX ORDER — CEFAZOLIN SODIUM 2 G/100ML
2 INJECTION, SOLUTION INTRAVENOUS ONCE
Status: DISCONTINUED | OUTPATIENT
Start: 2024-09-05 | End: 2024-09-05 | Stop reason: HOSPADM

## 2024-09-05 RX ORDER — SODIUM CHLORIDE, SODIUM LACTATE, POTASSIUM CHLORIDE, CALCIUM CHLORIDE 600; 310; 30; 20 MG/100ML; MG/100ML; MG/100ML; MG/100ML
20 INJECTION, SOLUTION INTRAVENOUS CONTINUOUS
Status: DISCONTINUED | OUTPATIENT
Start: 2024-09-05 | End: 2024-09-05 | Stop reason: HOSPADM

## 2024-09-05 RX ORDER — ROCURONIUM BROMIDE 10 MG/ML
INJECTION, SOLUTION INTRAVENOUS AS NEEDED
Status: DISCONTINUED | OUTPATIENT
Start: 2024-09-05 | End: 2024-09-05

## 2024-09-05 RX ORDER — MIDAZOLAM HYDROCHLORIDE 1 MG/ML
INJECTION, SOLUTION INTRAMUSCULAR; INTRAVENOUS CONTINUOUS PRN
Status: DISCONTINUED | OUTPATIENT
Start: 2024-09-05 | End: 2024-09-05

## 2024-09-05 RX ORDER — ACETAMINOPHEN 500 MG
1000 TABLET ORAL EVERY 8 HOURS PRN
Qty: 90 TABLET | Refills: 0 | Status: SHIPPED | OUTPATIENT
Start: 2024-09-05

## 2024-09-05 RX ORDER — MELOXICAM 15 MG/1
15 TABLET ORAL DAILY PRN
Qty: 30 TABLET | Refills: 0 | Status: SHIPPED | OUTPATIENT
Start: 2024-09-05 | End: 2024-10-05

## 2024-09-05 RX ORDER — MIDAZOLAM HYDROCHLORIDE 1 MG/ML
INJECTION INTRAMUSCULAR; INTRAVENOUS AS NEEDED
Status: DISCONTINUED | OUTPATIENT
Start: 2024-09-05 | End: 2024-09-05

## 2024-09-05 RX ORDER — GABAPENTIN 300 MG/1
300 CAPSULE ORAL NIGHTLY
Qty: 5 CAPSULE | Refills: 0 | Status: SHIPPED | OUTPATIENT
Start: 2024-09-05 | End: 2024-09-10

## 2024-09-05 RX ORDER — ONDANSETRON HYDROCHLORIDE 2 MG/ML
4 INJECTION, SOLUTION INTRAVENOUS ONCE AS NEEDED
Status: DISCONTINUED | OUTPATIENT
Start: 2024-09-05 | End: 2024-09-05 | Stop reason: HOSPADM

## 2024-09-05 RX ORDER — DOCUSATE SODIUM 100 MG/1
100 CAPSULE, LIQUID FILLED ORAL 2 TIMES DAILY PRN
Qty: 28 CAPSULE | Refills: 0 | Status: SHIPPED | OUTPATIENT
Start: 2024-09-05 | End: 2024-09-19

## 2024-09-05 RX ORDER — CEFAZOLIN 1 G/1
INJECTION, POWDER, FOR SOLUTION INTRAVENOUS AS NEEDED
Status: DISCONTINUED | OUTPATIENT
Start: 2024-09-05 | End: 2024-09-05

## 2024-09-05 RX ORDER — PHENYLEPHRINE HYDROCHLORIDE 10 MG/ML
INJECTION INTRAVENOUS AS NEEDED
Status: DISCONTINUED | OUTPATIENT
Start: 2024-09-05 | End: 2024-09-05

## 2024-09-05 RX ORDER — MEPERIDINE HYDROCHLORIDE 25 MG/ML
12.5 INJECTION INTRAMUSCULAR; INTRAVENOUS; SUBCUTANEOUS EVERY 10 MIN PRN
Status: DISCONTINUED | OUTPATIENT
Start: 2024-09-05 | End: 2024-09-05 | Stop reason: HOSPADM

## 2024-09-05 RX ORDER — DROPERIDOL 2.5 MG/ML
0.62 INJECTION, SOLUTION INTRAMUSCULAR; INTRAVENOUS ONCE AS NEEDED
Status: DISCONTINUED | OUTPATIENT
Start: 2024-09-05 | End: 2024-09-05 | Stop reason: HOSPADM

## 2024-09-05 RX ORDER — OXYCODONE HYDROCHLORIDE 5 MG/1
5 TABLET ORAL EVERY 6 HOURS PRN
Qty: 28 TABLET | Refills: 0 | Status: SHIPPED | OUTPATIENT
Start: 2024-09-05

## 2024-09-05 RX ORDER — SODIUM CHLORIDE, SODIUM LACTATE, POTASSIUM CHLORIDE, CALCIUM CHLORIDE 600; 310; 30; 20 MG/100ML; MG/100ML; MG/100ML; MG/100ML
100 INJECTION, SOLUTION INTRAVENOUS CONTINUOUS
Status: DISCONTINUED | OUTPATIENT
Start: 2024-09-05 | End: 2024-09-05 | Stop reason: HOSPADM

## 2024-09-05 RX ORDER — SODIUM CHLORIDE 9 MG/ML
INJECTION, SOLUTION INTRAVENOUS CONTINUOUS PRN
Status: DISCONTINUED | OUTPATIENT
Start: 2024-09-05 | End: 2024-09-05

## 2024-09-05 RX ORDER — OXYCODONE HYDROCHLORIDE 5 MG/1
5 TABLET ORAL EVERY 4 HOURS PRN
Status: DISCONTINUED | OUTPATIENT
Start: 2024-09-05 | End: 2024-09-05 | Stop reason: HOSPADM

## 2024-09-05 RX ORDER — DIPHENHYDRAMINE HYDROCHLORIDE 50 MG/ML
12.5 INJECTION INTRAMUSCULAR; INTRAVENOUS ONCE AS NEEDED
Status: DISCONTINUED | OUTPATIENT
Start: 2024-09-05 | End: 2024-09-05 | Stop reason: HOSPADM

## 2024-09-05 RX ORDER — LIDOCAINE HCL/PF 100 MG/5ML
SYRINGE (ML) INTRAVENOUS AS NEEDED
Status: DISCONTINUED | OUTPATIENT
Start: 2024-09-05 | End: 2024-09-05

## 2024-09-05 RX ORDER — ONDANSETRON HYDROCHLORIDE 2 MG/ML
INJECTION, SOLUTION INTRAVENOUS AS NEEDED
Status: DISCONTINUED | OUTPATIENT
Start: 2024-09-05 | End: 2024-09-05

## 2024-09-05 RX ORDER — PROPOFOL 10 MG/ML
INJECTION, EMULSION INTRAVENOUS AS NEEDED
Status: DISCONTINUED | OUTPATIENT
Start: 2024-09-05 | End: 2024-09-05

## 2024-09-05 RX ORDER — DEXMEDETOMIDINE IN 0.9 % NACL 20 MCG/5ML
SYRINGE (ML) INTRAVENOUS AS NEEDED
Status: DISCONTINUED | OUTPATIENT
Start: 2024-09-05 | End: 2024-09-05

## 2024-09-05 RX ORDER — ASPIRIN 325 MG
325 TABLET, DELAYED RELEASE (ENTERIC COATED) ORAL 2 TIMES DAILY
Qty: 60 TABLET | Refills: 0 | Status: SHIPPED | OUTPATIENT
Start: 2024-09-05 | End: 2024-10-05

## 2024-09-05 RX ORDER — ALBUTEROL SULFATE 0.83 MG/ML
2.5 SOLUTION RESPIRATORY (INHALATION) ONCE AS NEEDED
Status: DISCONTINUED | OUTPATIENT
Start: 2024-09-05 | End: 2024-09-05 | Stop reason: HOSPADM

## 2024-09-05 RX ADMIN — SODIUM CHLORIDE, POTASSIUM CHLORIDE, SODIUM LACTATE AND CALCIUM CHLORIDE 20 ML/HR: 600; 310; 30; 20 INJECTION, SOLUTION INTRAVENOUS at 08:56

## 2024-09-05 SDOH — HEALTH STABILITY: MENTAL HEALTH: CURRENT SMOKER: 0

## 2024-09-05 ASSESSMENT — PAIN SCALES - GENERAL
PAINLEVEL_OUTOF10: 0 - NO PAIN

## 2024-09-05 ASSESSMENT — PAIN - FUNCTIONAL ASSESSMENT
PAIN_FUNCTIONAL_ASSESSMENT: 0-10

## 2024-09-05 NOTE — ANESTHESIA PREPROCEDURE EVALUATION
"Patient: Taras Wesley    Procedure Information       Date/Time: 09/05/24 1010    Procedure: REPAIR ARTHROSCOPY ANTERIOR CRUCIATE LIGAMENT KNEE (Right: Knee) - ACL reconstruction with patellar tendon autograft with meniscus repairs    Location: [facility] / [facility]    Surgeons: KYLER Alejandro MD          There were no vitals filed for this visit.    Past Surgical History:   Procedure Laterality Date    WISDOM TOOTH EXTRACTION       Past Medical History:   Diagnosis Date    Concussion     Concussion        Current Facility-Administered Medications:     lactated Ringer's infusion, 20 mL/hr, intravenous, Continuous, Celso oFntana MD  Prior to Admission medications    Medication Sig Start Date End Date Taking? Authorizing Provider   acetaminophen (Tylenol) 325 mg suppository Insert into the rectum.    Historical Provider, MD   ibuprofen 200 mg tablet Take 1 tablet (200 mg) by mouth every 6 hours.    Historical Provider, MD     No Known Allergies  Social History     Tobacco Use    Smoking status: Some Days     Types: Cigars     Passive exposure: Current    Smokeless tobacco: Never   Substance Use Topics    Alcohol use: Yes     Comment: occasional         Chemistry    Lab Results   Component Value Date/Time     08/29/2024 0904    K 4.3 08/29/2024 0904     08/29/2024 0904    CO2 27 08/29/2024 0904    BUN 16 08/29/2024 0904    CREATININE 1.01 08/29/2024 0904    Lab Results   Component Value Date/Time    CALCIUM 9.2 08/29/2024 0904    ALKPHOS 45 08/29/2024 0904    AST 14 08/29/2024 0904    ALT 20 08/29/2024 0904    BILITOT 0.5 08/29/2024 0904          Lab Results   Component Value Date/Time    WBC 6.9 08/29/2024 0904    HGB 16.1 08/29/2024 0904    HCT 46.3 08/29/2024 0904     08/29/2024 0904     No results found for: \"PROTIME\", \"PTT\", \"INR\"  No results found for this or any previous visit (from the past 4464 hour(s)).  No results found for this or any previous visit from the past 1095 " days.      Relevant Problems   No relevant active problems       Clinical information reviewed:   Tobacco  Allergies    Med Hx  Surg Hx   Fam Hx  Soc Hx        NPO Detail:  NPO/Void Status  Carbohydrate Drink Given Prior to Surgery? : N  Date of Last Liquid: 09/04/24  Time of Last Liquid: 2300  Date of Last Solid: 09/04/24  Time of Last Solid: 2300  Last Intake Type: Clear fluids  Time of Last Void: 0800         Physical Exam    Airway  Mallampati: II     Cardiovascular - normal exam     Dental    Pulmonary    Abdominal            Anesthesia Plan    History of general anesthesia?: yes  History of complications of general anesthesia?: no    ASA 3     MAC     The patient is not a current smoker.  Patient was not previously instructed to abstain from smoking on day of procedure.  Patient did not smoke on day of procedure.  Education provided regarding risk of obstructive sleep apnea.  intravenous induction   Anesthetic plan and risks discussed with patient.    Plan discussed with CRNA.

## 2024-09-05 NOTE — ANESTHESIA PROCEDURE NOTES
Airway  Date/Time: 9/5/2024 9:28 AM  Urgency: elective    Airway not difficult    Staffing  Performed: CRNA   Authorized by: Ceslo Fontana MD    Performed by: ANDERSON Bundy-JOSE DANIEL  Patient location during procedure: OR    Indications and Patient Condition  Indications for airway management: anesthesia and airway protection  Spontaneous Ventilation: absent  Sedation level: deep  Preoxygenated: yes  Patient position: sniffing  MILS maintained throughout  Mask difficulty assessment: 2 - vent by mask + OA or adjuvant +/- NMBA (elective 9 oral airway with BMV d/t beard/MUSTAUCHE)  Planned trial extubation    Final Airway Details  Final airway type: endotracheal airway      Successful airway: ETT  Cuffed: yes   Successful intubation technique: video laryngoscopy  Facilitating devices/methods: intubating stylet  Endotracheal tube insertion site: oral  Blade: Uvaldo  Blade size: #4  ETT size (mm): 7.5  Cormack-Lehane Classification: grade I - full view of glottis  Placement verified by: chest auscultation and capnometry   Cuff volume (mL): 7  Measured from: lips  ETT to lips (cm): 21  Number of attempts at approach: 1

## 2024-09-05 NOTE — ANESTHESIA PROCEDURE NOTES
Peripheral Block    Patient location during procedure: pre-op  Reason for block: at surgeon's request and post-op pain management  Staffing  Performed: attending   Authorized by: Celso Fontana MD    Performed by: Celso Fontana MD  Preanesthetic Checklist  Completed: patient identified, IV checked, site marked, risks and benefits discussed, surgical consent, monitors and equipment checked, pre-op evaluation and timeout performed   Timeout performed at:   Peripheral Block  Patient position: laying flat  Prep: ChloraPrep and site prepped and draped  Patient monitoring: continuous pulse ox  Block type: femoral  Laterality: right  Injection technique: single-shot  Guidance: ultrasound guided  Needle  Needle gauge: 22 G  Needle length: 8 cm  Needle localization: anatomical landmarks, nerve stimulator and ultrasound guidance  Assessment  Injection assessment: negative aspiration for heme, no paresthesia on injection, incremental injection and local visualized surrounding nerve on ultrasound  Paresthesia pain: none  Heart rate change: no  Slow fractionated injection: yes  Additional Notes  30mL 0.5% marcaine 1:300k epi 5mg decadron

## 2024-09-05 NOTE — ANESTHESIA PROCEDURE NOTES
Peripheral Block    Patient location during procedure: pre-op  Reason for block: at surgeon's request and post-op pain management  Staffing  Performed: attending   Authorized by: Celso Fontana MD    Performed by: eClso Fontana MD  Preanesthetic Checklist  Completed: patient identified, IV checked, site marked, risks and benefits discussed, surgical consent, monitors and equipment checked, pre-op evaluation and timeout performed   Timeout performed at:   Peripheral Block  Patient position: laying flat  Prep: ChloraPrep and site prepped and draped  Patient monitoring: heart rate and continuous pulse ox  Block type: sciatic  Laterality: right  Injection technique: single-shot  Guidance: ultrasound guided  Needle  Needle type: short-bevel   Needle gauge: 22 G  Needle length: 5 cm  Needle localization: anatomical landmarks, ultrasound guidance and nerve stimulator  Assessment  Injection assessment: negative aspiration for heme, no paresthesia on injection, incremental injection and local visualized surrounding nerve on ultrasound  Paresthesia pain: none  Heart rate change: no  Slow fractionated injection: yes  Additional Notes  30mL 0.5% marcaine 1:300k epi 5mg decadron           (4) rarely moist

## 2024-09-05 NOTE — OP NOTE
REPAIR ARTHROSCOPY ANTERIOR CRUCIATE LIGAMENT KNEE (R) Operative Note     Date: 2024  OR Location: [facility] OR    Name: Taras Wesley, : 1995, Age: 29 y.o., MRN: [id number], Sex: male    ORTHOPEDIC OPERATIVE REPORT / POST-OP NOTE    SURGEON:  Elliot Alejandro MD  ASSISTANT(S):  Lawanda Gregory PA-C, Lopez Loredo MD  PRE-OPERATIVE DIAGNOSIS: Right knee ACL rupture, bucket-handle medial meniscus tear  POST-OPERATIVE DIAGNOSIS:  Same  PROCEDURE: Right knee arthroscopy, ACL reconstruction with patellar tendon autograft, medial meniscus repair  CPT CODE(S):  63883, 42456  ANESTHESIA:  general + regional  ESTIMATED BLOOD LOSS: Minimal  TOURNIQUET TIME: 60 minutes at 300 mmHg  SPECIMEN:  None  FINDINGS:  Above  COMPLICATIONS:  None  CONDITION:  Stable to the Recovery Room    I, Dr Alejandro, was present and scrubbed for the entire surgical procedure, including wound closure.         INDICATION FOR SURGERY:  29-year-old male with acute right knee pain.  The patient had a significant injury to their right knee.  After the injury, the patient's knee swelled up and they had difficulty bearing weight.  On physical examination, the patient had a positive Lachman exam without clear endpoint, positive anterior drawer, and guarded for pivot shift testing.  They had tenderness over the medial and lateral joint lines with a positive Moni's.   X-rays showed a relatively normal knee with no significant arthritis and MRI showed right knee ACL rupture and bucket-handle medial meniscus tear.  We discussed the operative and nonoperative options for ACL ruptures and the risks versus benefits of each.  We discussed a knee arthroscopy with ACL reconstruction with patellar tendon autograft with allograft backup, meniscus debridement versus repair.  The patient understood all the risks versus benefits of operative and non-operative treatment options.  The risks of knee arthroscopy with ACL reconstruction were discussed, which  included but were not limited to: risk of continued pain or continued instability, risks of infection, bleeding, nerve, artery, or muscle damage, risk of fracture, risk of need for additional surgery, risk of disease transmission if an allograft is needed intraoperatively, risk of patellar fracture or patellar tendon rupture, risk of anesthesia including risks of heart attack, stroke, or even death.  The patient wanted to proceed with surgery and signed appropriate surgical consents.  On the morning of surgery, I signed the patient's operative knee the preoperative holding area.      PROCEDURE:  The patient was brought to the operating room and was placed supine on the operating room table.  All of their bony prominences were padded.  A operating room huddle was performed and the patient received IV antibiotics.  SCDs were applied to the non-operative lower extremity and a non-sterile tournaquet was placed around the operative proximal thigh.   The patient's right lower extremity was prepped and draped in the normal sterile fashion.  A pre-incision timeout was called.  An examination of the operative leg was performed and the patient was found to have a positive Lachman, positive anterior drawer, positive pivot shift test.  The decision was made to proceed with ACL reconstruction.  The Esmarch was used and tourniquet was inflated at 300 mmHg.      An incision was made over the patellar tendon.  The incision was carried down to the peritenon which was sharply incised.  The central 1 cm of the patellar tendon was then harvested with a bone plug from the tibial tubercle and the bone plug from the patella, making sure that the patellar bone plug was located centrally on the patella, did not go more than two thirds the way proximal through the patella and was not more than 1 cm deep.  The wounds were then copiously irrigated and bone graft was harvested from the tibial tubercle and used to pack the patellar defect.  The  periosteum over the patella was then closed with 0 Vicryl interrupted sutures and the patellar tendon was loosely reapproximated with 0 Vicryl interrupted sutures.  The patellar tendon autograft was prepared on the back table so that the patellar bone plug was 10 mm in diameter and 22 mm long and the tibial tubercle bone plug was 11 mm in diameter.  Drill holes were placed through each of the bone plugs for high-strength suture for passing.  The patellar bone plug was prepared to go into the femoral tunnel while the tibial tubercle bone plug was prepared to go into the tibial tunnel.  The graft was then soaked in a vancomycin solution.  The tourniquet was then let down and bleeders were coagulated with electrocautery.    A knee arthroscopy was then performed, using the standard anteromedial and anterolateral portals, as well as a low anterior medial drilling portal.   Throughout the knee arthroscopy, no loose bodies were identified.   In the patellofemoral joint (patella / trochlea), the cartilage was well-maintained.     In the medial compartment, the medial meniscus was found to have a bucket-handle tear of the medial meniscus extending from the posterior root to the anterior horn of the medial meniscus.  A shaver was used in order to debride the backside of the meniscus tear and then an all inside meniscus repair was performed using 7 all inside meniscus repair sutures.  After this was performed the medial meniscus repair was probed and found to be extremely stable.  The cartilage of the medial compartment was well-maintained.    In the notch, the ACL was found to be completely ruptured.  The torn ACL was debrided out.  The PCL was found to be intact and had good, appropriate tension.      In the lateral compartment, the lateral meniscus was found to be intact.  The cartilage of the lateral compartment was well-maintained.    Attention was then turned back to the torn ACL.  Any remaining ACL tissue was debrided  out, exposing the lateral intercondylar ridge on the femur and the tibial spines on the tibia.  A flexible guidepin was carefully drilled from the anatomic origin of the ACL through the lateral femoral condyle with the knee in hyperextension.  Using a size 10 mm reamer, the guidepin was overdrilled to a depth of 25 mm.   A tibial drill guide was used to drill a guidepin up into the anatomic insertion of the ACL on the tibia.  This was overdrilled with a size 11 mm reamer.  The patellar tendon autograft was then passed up through the tunnels so that the patellar bone plug was seated well in the femoral tunnel.  The femoral tunnel was then tapped and a 8 x 25 mm interference screw was advanced into the femoral tunnel securing the patellar bone plug in the femoral tunnel.  The knee was cycled to remove any creep from the graft while strong tension was applied to the sutures attached to the tibial tubercle bone plug in the tibial tunnel.  The knee was then put to full extension and strong tension was applied the sutures attached to the tibial tubercle bone plug.  While strong tension was applied the sutures attached the tibial tubercle bone plug, the tibial tunnel was tapped and then a 8 x 35 mm interference screw was used to secure the tibial tubercle bone plug in the tibial tunnel.  A small amount of tibial tubercle bone plug sticking out of the tibial tunnel was removed with a rongeur.  The sutures coming from the tibial tubercle bone plug were backed up using supplementary fixation of a knotless suture anchor in the tibia.    The knee was then put through full range of motion and found to go from full extension to 135 degrees of flexion.  The patient had a negative Lachman with very solid endpoint, negative anterior drawer, negative pivot shift.  The scope was with back into the knee and the ACL was found to have excellent tension and to be perfectly tight with the knee at full extension.  The knee was drained of  fluid, the arthroscopic portals were closed with 3-0 nylon skin sutures.  The patellar tendon incision was thuroughly irrigated and then closed in layers with 0 Vicryl interrupted deep sutures, 2-0 Vicryl interrupted subcutaneous sutures, and 3-0 Prolene running subcuticular sutures in the skin.  Steri-Strips, Xeroform, dry sterile dressing, and a hinged knee brace locked in full extension was applied.  The patient was awoken and brought to recovery room in good condition.  There were no complications.

## 2024-09-05 NOTE — DISCHARGE INSTRUCTIONS
Wound Care     Your dressing should remain intact and dry until your post op visit in the office.      Immobilizer/Brace  You have been provided with a knee brace, please keep the brace on at all times when up and about and while sleeping.  It is ok to open the brace and allow your skin to breathe while at rest.     Crutches/Elevation of Leg  You should remain on crutches and place no weight on your operative leg.  Anesthesia effects can last up until 48 hours after surgery and during this time there is a very high risk of falling, please have assistance with you whenever you are up and about.  Any weight on your leg during the first 48 hours may result in a fall, please be extremely careful.  When sitting or lying down, please keep your operative leg elevated, do not place anything directly under your knee, this may cause a slight bend in your knee and can lead to difficulty regaining full extension in your knee.     Non-weight bearing - no weight on your operative leg (you must use crutches at all times)     Continuous icing will help to decrease swelling and provide pain relief.  You may use ice packs every 2 hours for 20 minutes daily until your first post-operative visit.  It is very important to always have protection between the ice pad and your skin.  Never place the ice pad directly on your skin; this could lead to an injury to your skin.      Driving  Please do not drive until you are evaluated in the office after surgery.  You are considered an impaired  following surgery, and if you choose to drive, your insurance may not cover any accidents that may occur.           Post-operative Medication     1. Aspirin 325mg 1 tablet twice a day for 4 weeks following surgery.  Aspirin helps to reduce the risk of blood clots following surgery. Do not take Aspirin if you are 20 years old or younger.     2. Colace 1 tablet twice a day.  Colace is used to prevent constipation while taking pain medication.     3.   Mobic 15 mg take 1 tablet daily for 5 days, after 5 days take 1 tablet daily only as needed for pain.     4.Tylenol 1,000 mg every 8 hours for 5 days, after 5 days take 1,000 mg every 8 hours only as needed for pain.     5.Gabapentin 300mg daily at bedtime for 5 days, after 5 days you should no longer need this medication.     6. Oxy IR 5mg every 6 hours ONLY TO BE TAKEN FOR SEVERE PAIN.  Do NOT take Oxy IR within 3 hours of taking Gabapentin. Do NOT take Gabapentin within 3 hours of taking Oxy IR. If you are having severe pain and taking Oxy IR at night, skip the nightly Gabapentin and only resume when you are not taking Oxy IR at night        Signs and Symptoms of Complications  Although complications are rare the following are a list of symptoms you should be aware of.    Infection - increased pain not relieved with medication, fever, chills, redness, swelling or drainage from incision.  Blood Clot - swelling, tenderness, or pain to calf when you move your ankle up and down. If your calf is sore, please DO NOT massage calf.    If you experience chest pain, shortness of breath or difficulty breathing; please report to emergency room immediately.              If a follow-up visit after surgery was not scheduled, please call Dr. Alejandro's office at [phone] during office business hours (Monday to Friday, 8:30am to 3:30pm) to arrange a follow-up appointment for 2 weeks after your surgery.     If you have any questions, please call Dr. Alejandro's office at [phone] during office business hours (Monday to Friday, 8:30am to 3:30pm).  Please do not call Dr. Alejandro's office after 3:30pm or on weekends or holidays.  If you have an urgent issue after 3:30pm or on weekends or holidays, please go immediately to a local emergency room to be evaluated.

## 2024-09-05 NOTE — ANESTHESIA POSTPROCEDURE EVALUATION
Patient: Taras Wesley    Procedure Summary       Date: 09/05/24 Room / Location: [facility] OR 01 / [facility]    Anesthesia Start: 0919 Anesthesia Stop: 1237    Procedure: REPAIR ARTHROSCOPY ANTERIOR CRUCIATE LIGAMENT KNEE (Right: Knee) Diagnosis:       Acute medial meniscus tear of right knee, initial encounter      Acute injury of anterior cruciate ligament, right, initial encounter      Bucket handle tear of medial meniscus of right knee, initial encounter      (Acute medial meniscus tear of right knee, initial encounter [S83.241A])      (Acute injury of anterior cruciate ligament, right, initial encounter [S89.91XA])      (Bucket handle tear of medial meniscus of right knee, initial encounter [S83.211A])    Surgeons: KYLER Alejandro MD Responsible Provider: Celso Fontana MD    Anesthesia Type: MAC ASA Status: 3            Anesthesia Type: MAC    Vitals Value Taken Time   /75 09/05/24 1300   Temp 36.5 °C (97.7 °F) 09/05/24 1233   Pulse 72 09/05/24 1300   Resp 15 09/05/24 1300   SpO2 99 % 09/05/24 1245       Anesthesia Post Evaluation    Patient location during evaluation: PACU  Patient participation: complete - patient participated  Level of consciousness: awake  Pain management: adequate  Multimodal analgesia pain management approach  Airway patency: patent  Two or more strategies used to mitigate risk of obstructive sleep apnea  Cardiovascular status: acceptable  Respiratory status: acceptable  Hydration status: acceptable  Postoperative Nausea and Vomiting: none        There were no known notable events for this encounter.

## 2024-09-12 ENCOUNTER — HOSPITAL ENCOUNTER (OUTPATIENT)
Dept: RADIOLOGY | Facility: HOSPITAL | Age: 29
Discharge: HOME | End: 2024-09-12
Payer: COMMERCIAL

## 2024-09-12 ENCOUNTER — APPOINTMENT (OUTPATIENT)
Dept: RADIOLOGY | Facility: CLINIC | Age: 29
End: 2024-09-12
Payer: COMMERCIAL

## 2024-09-12 ENCOUNTER — APPOINTMENT (OUTPATIENT)
Dept: ORTHOPEDIC SURGERY | Facility: CLINIC | Age: 29
End: 2024-09-12
Payer: COMMERCIAL

## 2024-09-12 VITALS — BODY MASS INDEX: 28.35 KG/M2 | WEIGHT: 198 LBS | HEIGHT: 70 IN

## 2024-09-12 DIAGNOSIS — Z98.890 S/P RECONSTRUCTION OF ACL OF RIGHT KNEE USING BONE-PATELLAR TENDON-BONE AUTOGRAFT: ICD-10-CM

## 2024-09-12 DIAGNOSIS — Z98.890 S/P ACL RECONSTRUCTION: Primary | ICD-10-CM

## 2024-09-12 DIAGNOSIS — Z98.890 STATUS POST MEDIAL MENISCUS REPAIR: ICD-10-CM

## 2024-09-12 PROCEDURE — 99024 POSTOP FOLLOW-UP VISIT: CPT

## 2024-09-12 ASSESSMENT — PAIN - FUNCTIONAL ASSESSMENT: PAIN_FUNCTIONAL_ASSESSMENT: 0-10

## 2024-09-12 ASSESSMENT — PAIN SCALES - GENERAL: PAINLEVEL_OUTOF10: 3

## 2024-09-12 NOTE — PROGRESS NOTES
Taras came in today for a right knee dressing change S/P 9/5/24 rt knee ACL reconstruction. Old bandages were taken off and new bandages applied.  Taras complained of some right calf tenderness and swelling along the top of the leg, not warm to the touch. Remedios Sales PA-C took a look at the swelling. Patient was advised to keep an eye on the swelling and go to a local emergency room if the swelling and tenderness of the calf gets worse. Patients brace was re-adjusted. Taras didn't have any questions before he left and confirmed his next post op appointment with Remedios Sales PA-C on 9/18/24 at 1:30.  PEDRITO PETTY MA

## 2024-09-18 ENCOUNTER — HOSPITAL ENCOUNTER (OUTPATIENT)
Dept: RADIOLOGY | Facility: HOSPITAL | Age: 29
Discharge: HOME | End: 2024-09-18
Payer: COMMERCIAL

## 2024-09-18 ENCOUNTER — APPOINTMENT (OUTPATIENT)
Dept: ORTHOPEDIC SURGERY | Facility: CLINIC | Age: 29
End: 2024-09-18
Payer: COMMERCIAL

## 2024-09-18 DIAGNOSIS — S89.91XA ACUTE INJURY OF ANTERIOR CRUCIATE LIGAMENT, RIGHT, INITIAL ENCOUNTER: ICD-10-CM

## 2024-09-18 DIAGNOSIS — Z98.890 STATUS POST MEDIAL MENISCUS REPAIR: ICD-10-CM

## 2024-09-18 DIAGNOSIS — Z98.890 S/P RECONSTRUCTION OF ACL OF RIGHT KNEE USING BONE-PATELLAR TENDON-BONE AUTOGRAFT: Primary | ICD-10-CM

## 2024-09-18 DIAGNOSIS — S83.241A ACUTE MEDIAL MENISCUS TEAR OF RIGHT KNEE, INITIAL ENCOUNTER: ICD-10-CM

## 2024-09-18 PROCEDURE — 73560 X-RAY EXAM OF KNEE 1 OR 2: CPT | Mod: RIGHT SIDE | Performed by: RADIOLOGY

## 2024-09-18 PROCEDURE — 99024 POSTOP FOLLOW-UP VISIT: CPT

## 2024-09-18 PROCEDURE — 73560 X-RAY EXAM OF KNEE 1 OR 2: CPT | Mod: RT

## 2024-09-18 ASSESSMENT — PAIN - FUNCTIONAL ASSESSMENT: PAIN_FUNCTIONAL_ASSESSMENT: NO/DENIES PAIN

## 2024-09-18 NOTE — PROGRESS NOTES
History of Present Illness  Chief Complaint   Patient presents with    Right Knee - Post-op     9/5/24 RT KNEE ACL RECONSTRUCTION, PATELLAR TENDON AUTOGRAFT, ALLOGRAFT BACKUP MEDIAL MENISCUC REPAIR- Pt doing well. No new concerns.     Patient returns today denying any significant pain.  He has remained in brace locked out, NWB since surgery. Denies any new neuro deficits. No fever or drainage.     Review of Systems   GENERAL: Negative for malaise, significant weight loss, fever  MUSCULOSKELETAL: see HPI  NEURO:  Negative     Examination  side: right wrist  Healthy incision without erythema, warmth, or drainage.  Skin pink and irritated  about lateral incision.  Clearing ecchymosis distal thigh and calf.  Edema improved from last week in calf.   Sensation intact distal to knee  ROM purposely not assessed given 2 weeks post op.  Good cap refill     Assessment:  Patient status post side: right ACL reconstruction and medial meniscus repair     Plan  Patient to continue in brace locked in extension and be NWB until 6 weeks post op.    He can start PT and working on knee ROM per protocol.  Can unlock brace at rest but should be locked out when ambulating. PT orders given  Sutures were removed, knee redressed and brace put back on leg.  Discussed signs of infection and reasons to follow up with the office.  Follow-up: in 4 weeks    Remedios Sales PA-C  09/18/24

## 2024-09-24 ENCOUNTER — EVALUATION (OUTPATIENT)
Dept: PHYSICAL THERAPY | Facility: CLINIC | Age: 29
End: 2024-09-24

## 2024-09-24 DIAGNOSIS — M25.561 RIGHT KNEE PAIN: Primary | ICD-10-CM

## 2024-09-24 DIAGNOSIS — Z98.890 S/P RECONSTRUCTION OF ACL OF RIGHT KNEE USING BONE-PATELLAR TENDON-BONE AUTOGRAFT: ICD-10-CM

## 2024-09-24 PROCEDURE — 97161 PT EVAL LOW COMPLEX 20 MIN: CPT | Mod: GP | Performed by: PHYSICAL THERAPIST

## 2024-09-24 ASSESSMENT — ENCOUNTER SYMPTOMS
LOSS OF SENSATION IN FEET: 0
DEPRESSION: 0
OCCASIONAL FEELINGS OF UNSTEADINESS: 0

## 2024-09-24 ASSESSMENT — PAIN SCALES - GENERAL: PAINLEVEL_OUTOF10: 0 - NO PAIN

## 2024-09-24 ASSESSMENT — PAIN - FUNCTIONAL ASSESSMENT: PAIN_FUNCTIONAL_ASSESSMENT: 0-10

## 2024-09-24 NOTE — PROGRESS NOTES
Physical Therapy  Physical Therapy Evaluation      Patient Name: Taras Wesley  MRN: [id number]  Today's Date: 9/24/2024  Time Calculation  Start Time: 0845  Stop Time: 0930  Time Calculation (min): 45 min  General  General Comment: Onset Date: Surgery 9/5/2024; August 10th; no auth; Patient was messing around in the back yard and was doing a spin kick. When he landed he felt a pop in the knee. He was able to get up and move, but felt some pain. He went to urgent care and was on crutches. He had mensicus tear bucket handle tear and ACL tear. Patient is NWB and on axillary crutches. He is following protocol with some education. Patient noted forefoot is numb on the plantar surface    TREATING DIAGNOSIS:  1. Right knee pain  Follow Up In Physical Therapy      2. S/P reconstruction of ACL of right knee using bone-patellar tendon-bone autograft  Referral to Physical Therapy          ASSESSMENT: Patient is a 30 yo male  s/p right ACL reconstruction and repair of bucket handle tear of the medial meniscus resulting in limited participation in pain-free ADLs and inability to perform at their prior level of function specifically,  standing, walking, and ADLs. Pt would benefit from skilled Physical Therapy services to address the impairments of:    in order to return to safe and pain-free ADL's and prior level of function.    PLAN:      Planned Interventions include: therapeutic exercise, therapeutic activity, self-care home management, manual therapy, therapeutic activities, gait training, neuromuscular coordination, vasopneumatic, dry needling, electric stimulation, ultrasound, kinesiotaping, wound care    Goals:  Active       PT Problem       PT Goal 1       Start:  09/24/24    Expected End:  02/28/25            PT Goal 2       Start:  09/24/24    Expected End:  02/28/25            PT Goal 3       Start:  09/24/24    Expected End:  02/28/25            PT Goal 4       Start:  09/24/24    Expected End:  02/28/25        "     PT Goal 5       Start:  09/24/24    Expected End:  02/28/25            Patient Stated Goal 1       Start:  09/24/24    Expected End:  02/28/25              Plan of care was developed with input and agreement by the patient.  Potential to achieve goals:  Good    Subjective:    Pt goals for therapy: \"I just want to get back to normal.\"  Pain Assessment: 0-10  0-10 (Numeric) Pain Score: 0 - No pain (night during movement: 2-3/10)  Pain Type: Surgical pain  Pain Location: Knee  Pain Orientation: Right  Aggravating Factors: Other driving, standing, walking,   Relieving Factors: Rest, Elevation, Compression, and Ice     Precautions:  Precautions  STEADI Fall Risk Score (The score of 4 or more indicates an increased risk of falling): 0  Precautions Comment: See protocol, NWB for 6 weeks with brace    Medical History Form: Reviewed (scanned into chart)    Current Condition since injury:   same     Relevant Information (PMH & Previous Tests/Imaging):     Previous Interventions/Treatments: None   Prior Level of Function (PLOF)Prior Function Per Pt/Caregiver Report  Level of [place]: Independent with ADLs and functional transfers (patient is not performing most of his normal daily activity at this time)  Receives Help From: Other (Comment) (significant other)  Vocational: Full time employment ([employer])  Exercise/Physical Activity: regularly workouts out, active male  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 3 steps  LUISA: Insidious during some martial arts moves in his backyard    Primary Language: English    Social Determinants of health:  (-) Lack of Money                                      (-) poor physical home environment     (-) no job/ poor work conditions    (-) un-education/illiteracy   (-) traumatic childhood " experiences            (-) lacks social supports/coping skills    (-) lacks healthy behaviors                     (-) lacks access to healthcare                   Red Flags:   REVIEW OF SYSTEMS/ RED FLAGS  (-) osteoporosis  (-) Fever/chills, SOB, loss of taste/smell  (-) Cough/ Sneeze   (-) balance difficulties/FALLS   (-) numbness/tingle  (-) weakness  (-) unremitting night pain   Sleep:  position:  (-) AM Stiffness:            (-) Unexplained Wt. Loss  (-) h/o CA   (-) Pacemaker  (-) Bowel/Bladder:            (-) saddle paresthesia    Objective:          See Flowsheets under the lower extremity section for full list of objective measures    Precautions:   Precautions  STEADI Fall Risk Score (The score of 4 or more indicates an increased risk of falling): 0  Precautions Comment: See protocol, NWB for 6 weeks with braceNWB 6 weeks post op, follow protocol     Medical History Form: Reviewed (scanned into chart)    EDUCATION: home exercise program, plan of care, activity modifications, pain management, and injury pathology   Access Code: [email or web address]  URL: [email or web address]  Date: 09/24/2024  Prepared by: SALENA Anderson [place]    Exercises  - Supine Single Leg Ankle Pumps  - 2 x daily - 7 x weekly - 1 sets - 30 reps  - Supine Heel Slide with Strap (Mirrored)  - 2 x daily - 7 x weekly - 2 sets - 10 reps - 5-8 seconds hold  - Supine Active Straight Leg Raise  - 2 x daily - 7 x weekly - 2 sets - 10 reps with brace  - Sidelying Hip Abduction  - 2 x daily - 7 x weekly - 2 sets - 10 reps with brace  - Sidelying Hip Adduction  - 2 x daily - 7 x weekly - 2 sets - 10 reps with brace    Treatments:   This therapist instructed and demonstrated interventions to patient, patient completed the following under direct supervision of this therapist:  PT Evaluation Time Entry  PT Evaluation (Low) Time Entry: 45            Laz Armstrong, PT

## 2024-09-27 ENCOUNTER — APPOINTMENT (OUTPATIENT)
Dept: PHYSICAL THERAPY | Facility: CLINIC | Age: 29
End: 2024-09-27

## 2024-09-30 ENCOUNTER — TREATMENT (OUTPATIENT)
Dept: PHYSICAL THERAPY | Facility: CLINIC | Age: 29
End: 2024-09-30

## 2024-09-30 DIAGNOSIS — M25.561 ACUTE PAIN OF RIGHT KNEE: Primary | ICD-10-CM

## 2024-09-30 DIAGNOSIS — M25.561 RIGHT KNEE PAIN: ICD-10-CM

## 2024-09-30 PROCEDURE — 97110 THERAPEUTIC EXERCISES: CPT | Mod: GP

## 2024-09-30 PROCEDURE — 97140 MANUAL THERAPY 1/> REGIONS: CPT | Mod: GP

## 2024-09-30 ASSESSMENT — PAIN SCALES - GENERAL: PAINLEVEL_OUTOF10: 0 - NO PAIN

## 2024-09-30 ASSESSMENT — PAIN - FUNCTIONAL ASSESSMENT: PAIN_FUNCTIONAL_ASSESSMENT: 0-10

## 2024-09-30 NOTE — PROGRESS NOTES
Physical Therapy  Physical Therapy Treatment    Patient Name: Taras Wesley  MRN: [id number]  Today's Date: 9/30/2024  Time Calculation  Start Time: 0846  Stop Time: 0928  Time Calculation (min): 42 min    Assessment:   Pt had no adverse effect of cupping to RLE, no problems with HEP at this time and is able to maintain form well, still has some hesitation with heel slides initially but improved by end of set    Pt completed treatment with moderate effort this date    Plan:  Continue per protocol       General  Chief Complaint:   1. Acute pain of right knee        2. Right knee pain  Follow Up In Physical Therapy          Subjective:     Pt reports numbness in big toe, it has reduced with medial leg    Current Problem  General  Reason for Referral: R ACL reconstruction  Referred By: Henrry  General Comment: visit 2    Precautions  Precautions Comment: See protocol, NWB for 6 weeks with brace    Performing HEP?: Yes    Pain  Pain Assessment: 0-10  0-10 (Numeric) Pain Score: 0 - No pain    Objective:     Treatments:   This therapist instructed and demonstrated interventions to patient, patient completed the following under direct supervision of this therapist:  Manual Therapy:       14 MINUTES  Manual Therapy  Manual Therapy Activity 1: STM to surgical incision  Manual Therapy Activity 2: static cups to femoral and sural nerve distribution for N/T to medial foot with ankle pumps  Therapeutic Exercise:   min  28 MINUTES  Therapeutic Exercise  Therapeutic Exercise Activity 1: SAQs x30  Therapeutic Exercise Activity 2: SLR x30  Therapeutic Exercise Activity 3: hip abduction x30  Therapeutic Exercise Activity 4: hip adduction x30  Therapeutic Exercise Activity 5: heel slides with OP x30      Active       PT Problem       PT Goal 1       Start:  09/24/24    Expected End:  02/28/25            PT Goal 2       Start:  09/24/24    Expected End:  02/28/25            PT Goal 3       Start:  09/24/24    Expected End:  02/28/25             PT Goal 4       Start:  09/24/24    Expected End:  02/28/25            PT Goal 5       Start:  09/24/24    Expected End:  02/28/25            Patient Stated Goal 1       Start:  09/24/24    Expected End:  02/28/25                Education:   Discussed benefits of dry cupping    Khari Almodovar, PT

## 2024-10-10 ENCOUNTER — TREATMENT (OUTPATIENT)
Dept: PHYSICAL THERAPY | Facility: CLINIC | Age: 29
End: 2024-10-10

## 2024-10-10 DIAGNOSIS — M25.561 RIGHT KNEE PAIN: ICD-10-CM

## 2024-10-10 DIAGNOSIS — M25.561 ACUTE PAIN OF RIGHT KNEE: Primary | ICD-10-CM

## 2024-10-10 PROCEDURE — 4200000003 HC PT PACKAGE TREATMENT: Mod: GP

## 2024-10-10 ASSESSMENT — PAIN - FUNCTIONAL ASSESSMENT: PAIN_FUNCTIONAL_ASSESSMENT: 0-10

## 2024-10-10 ASSESSMENT — PAIN SCALES - GENERAL: PAINLEVEL_OUTOF10: 2

## 2024-10-10 NOTE — PROGRESS NOTES
Physical Therapy  Physical Therapy Treatment    Patient Name: Taras Wesley  MRN: [id number]  Today's Date: 10/10/2024  Time Calculation  Start Time: 0800  Stop Time: 0845  Time Calculation (min): 45 min    Assessment:   Pt struggled mildly with knee flexion AROM but no increased pain, able to achieve 90 deg flexion on recumbent bicycle and near full TKE with exercises, still shows extensor lag with SLR    Pt completed treatment with moderate effort this date    Plan:  Continue per protocol        General  Chief Complaint:   1. Acute pain of right knee        2. Right knee pain  Follow Up In Physical Therapy          Subjective:     Pt reports some mild pain around the knee cap and his numbness has gone away along the inside of his leg    Current Problem  General  Reason for Referral: R ACL reconstruction  Referred By: Henrry  General Comment: visit 3    Precautions  Precautions Comment: post op week 5, NWB for 6 weeks with brace    Performing HEP?: Yes    Pain  Pain Assessment: 0-10  0-10 (Numeric) Pain Score: 2  Pain Type: Surgical pain  Pain Location: Knee  Pain Orientation: Right    Objective:     Treatments:   This therapist instructed and demonstrated interventions to patient, patient completed the following under direct supervision of this therapist:  Therapeutic Exercise:   min  45 MINUTES  Therapeutic Exercise  Therapeutic Exercise Activity 1: quad sets with towel behind knee x30  Therapeutic Exercise Activity 2: SAQs x30  Therapeutic Exercise Activity 3: heel slides with OP x30  Therapeutic Exercise Activity 4: hip abduction x30  Therapeutic Exercise Activity 5: hip adduction x30  Therapeutic Exercise Activity 6: prone flexion x30  Therapeutic Exercise Activity 7: standing knee flexion x30  Therapeutic Exercise Activity 8: recumbent bicycle half cycles no resistance x6 mins      Active       PT Problem       PT Goal 1       Start:  09/24/24    Expected End:  02/28/25            PT Goal 2       Start:   09/24/24    Expected End:  02/28/25            PT Goal 3       Start:  09/24/24    Expected End:  02/28/25            PT Goal 4       Start:  09/24/24    Expected End:  02/28/25            PT Goal 5       Start:  09/24/24    Expected End:  02/28/25            Patient Stated Goal 1       Start:  09/24/24    Expected End:  02/28/25                Education:   Discussed goals and changes to protocol after week 6    Khari Almodovar, PT

## 2024-10-14 ENCOUNTER — APPOINTMENT (OUTPATIENT)
Dept: ORTHOPEDIC SURGERY | Facility: CLINIC | Age: 29
End: 2024-10-14
Payer: COMMERCIAL

## 2024-10-14 ENCOUNTER — APPOINTMENT (OUTPATIENT)
Dept: PHYSICAL THERAPY | Facility: CLINIC | Age: 29
End: 2024-10-14
Payer: COMMERCIAL

## 2024-10-14 DIAGNOSIS — S83.241A ACUTE MEDIAL MENISCUS TEAR OF RIGHT KNEE, INITIAL ENCOUNTER: ICD-10-CM

## 2024-10-14 DIAGNOSIS — Z98.890 STATUS POST MEDIAL MENISCUS REPAIR: ICD-10-CM

## 2024-10-14 DIAGNOSIS — Z98.890 S/P RECONSTRUCTION OF ACL OF RIGHT KNEE USING BONE-PATELLAR TENDON-BONE AUTOGRAFT: Primary | ICD-10-CM

## 2024-10-14 DIAGNOSIS — S89.91XA ACUTE INJURY OF ANTERIOR CRUCIATE LIGAMENT, RIGHT, INITIAL ENCOUNTER: ICD-10-CM

## 2024-10-14 PROCEDURE — 99024 POSTOP FOLLOW-UP VISIT: CPT

## 2024-10-14 ASSESSMENT — PAIN - FUNCTIONAL ASSESSMENT: PAIN_FUNCTIONAL_ASSESSMENT: NO/DENIES PAIN

## 2024-10-14 NOTE — PROGRESS NOTES
"History of Present Illness  Chief Complaint   Patient presents with    Right Knee - Post-op     9/5/24 RT KNEE ACL RECONSTRUCTION, PATELLAR TENDON AUTOGRAFT, ALLOGRAFT BACKUP MEDIAL MENISCUC REPAIR-Pt doing well no new concerns-Thinks he may have had a stitch pop out    Patient returns today denying any significant pain, just sore from therapy. Denies any new neuro deficits. No fever or drainage.     Review of Systems   GENERAL: Negative for malaise, significant weight loss, fever  MUSCULOSKELETAL: see HPI  NEURO:  Negative     Examination  side: right wrist  Healthy incision without erythema, warmth, or drainage.  Small amount of blood in area where he states he removed a \"clear\" suture.   Sensation intact Distal to knee.   ROM 5-90  Good cap refill     Assessment:  Patient status post side: right ACL reconstruction with medial meniscus repair     Plan  Patient to continue to work with PT particularly on last bit of extension, plus overall ROM and strength.  He can now start with WB, first in the brace and then transition out of the brace over the next week or two so long as he has good quad control.  Continue with RICE and pain medication as needed.  Follow-up: in 6 weeks    Remedios Sales PA-C  10/14/24          "

## 2024-10-17 ENCOUNTER — TREATMENT (OUTPATIENT)
Dept: PHYSICAL THERAPY | Facility: CLINIC | Age: 29
End: 2024-10-17

## 2024-10-17 DIAGNOSIS — M25.561 RIGHT KNEE PAIN: ICD-10-CM

## 2024-10-17 DIAGNOSIS — M25.561 ACUTE PAIN OF RIGHT KNEE: Primary | ICD-10-CM

## 2024-10-17 PROCEDURE — 97110 THERAPEUTIC EXERCISES: CPT | Mod: GP

## 2024-10-17 ASSESSMENT — PAIN SCALES - GENERAL: PAINLEVEL_OUTOF10: 2

## 2024-10-17 ASSESSMENT — PAIN - FUNCTIONAL ASSESSMENT: PAIN_FUNCTIONAL_ASSESSMENT: 0-10

## 2024-10-17 NOTE — PROGRESS NOTES
"    Physical Therapy  Physical Therapy Treatment    Patient Name: Taras Wesley  MRN: [id number]  Today's Date: 10/17/2024  Time Calculation  Start Time: 0830  Stop Time: 0915  Time Calculation (min): 45 min    Assessment:   Pt tolerated new exercises with only mild discomfort, he didn't display extensor lag until x20 reps, is able to achieve TKE with OP but still some protective resistance when achieving full extension    Pt completed treatment with moderate effort this date    Plan:  Continue per protocol       General  Chief Complaint:   1. Acute pain of right knee        2. Right knee pain  Follow Up In Physical Therapy          Subjective:     Pt reports that he is now WB per PA and is asking about squats for HEP    Current Problem  General  Reason for Referral: R ACL reconstruction  Referred By: Henrry  General Comment: visit 4    Precautions  Precautions Comment: post op week 6, now WBAT    Performing HEP?: Yes    Pain  Pain Assessment: 0-10  0-10 (Numeric) Pain Score: 2  Pain Type: Surgical pain  Pain Location: Knee  Pain Orientation: Right    Objective:     Treatments:   This therapist instructed and demonstrated interventions to patient, patient completed the following under direct supervision of this therapist:  Therapeutic Exercise:   min  45 MINUTES  Therapeutic Exercise  Therapeutic Exercise Activity 1: RLE SLR x30  Therapeutic Exercise Activity 2: quad sets with towel behind knee x30  Therapeutic Exercise Activity 3: recumbent stepper x6 mins  Therapeutic Exercise Activity 4: SAQs x20  Therapeutic Exercise Activity 5: heel slides with OP from strap x30  Therapeutic Exercise Activity 6: partial wall sits 45 deg x60\", x30\"  Therapeutic Exercise Activity 7: prone knee hang 3# x3 mins  Therapeutic Exercise Activity 8: prone knee flexion x30      Active       PT Problem       PT Goal 1       Start:  09/24/24    Expected End:  02/28/25            PT Goal 2       Start:  09/24/24    Expected End:  02/28/25    "         PT Goal 3       Start:  09/24/24    Expected End:  02/28/25            PT Goal 4       Start:  09/24/24    Expected End:  02/28/25            PT Goal 5       Start:  09/24/24    Expected End:  02/28/25            Patient Stated Goal 1       Start:  09/24/24    Expected End:  02/28/25                Education:   Discussed new stage of rehab now WBAT, and progressing AROM and muscle endurance    Khari Almodovar, PT

## 2024-10-23 ENCOUNTER — TREATMENT (OUTPATIENT)
Dept: PHYSICAL THERAPY | Facility: CLINIC | Age: 29
End: 2024-10-23
Payer: COMMERCIAL

## 2024-10-23 DIAGNOSIS — M25.561 RIGHT KNEE PAIN: ICD-10-CM

## 2024-10-23 PROCEDURE — 4200000003 HC PT PACKAGE TREATMENT: Mod: GP | Performed by: PHYSICAL THERAPIST

## 2024-10-23 ASSESSMENT — PAIN SCALES - GENERAL: PAINLEVEL_OUTOF10: 2

## 2024-10-23 ASSESSMENT — PAIN - FUNCTIONAL ASSESSMENT: PAIN_FUNCTIONAL_ASSESSMENT: 0-10

## 2024-10-23 NOTE — PROGRESS NOTES
"    Physical Therapy  Physical Therapy Treatment    Patient Name: Taras Wesley  MRN: [id number]  Today's Date: 10/23/2024  Time Calculation  Start Time: 1145  Stop Time: 1230  Time Calculation (min): 45 min    General  Reason for Referral: R ACL reconstruction  Referred By: Henrry  General Comment: visit 5  Assessment:    Patient is progressing very well through post treatment protocol and is continuing to advance as directed.  Plan:  Progress as tolerated     Active       PT Problem       PT Goal 1       Start:  09/24/24    Expected End:  02/28/25            PT Goal 2       Start:  09/24/24    Expected End:  02/28/25            PT Goal 3       Start:  09/24/24    Expected End:  02/28/25            PT Goal 4       Start:  09/24/24    Expected End:  02/28/25            PT Goal 5       Start:  09/24/24    Expected End:  02/28/25            Patient Stated Goal 1       Start:  09/24/24    Expected End:  02/28/25                General  Chief Complaint:   1. Right knee pain  Follow Up In Physical Therapy          Subjective:     Current Problem  General  Reason for Referral: R ACL reconstruction  Referred By: Henrry  General Comment: visit 5    Precautions  Precautions Comment: post op week 7, now WBAT    Patient perform today's treatment with   improved knowledge    Pain  Pain Assessment: 0-10  0-10 (Numeric) Pain Score: 2  Pain Type: Surgical pain  Pain Location: Knee  Pain Orientation: Right    Objective:     Treatments:   This therapist instructed and demonstrated interventions to patient, patient completed the following under direct supervision of this therapist:      30 minutes  Therapeutic Exercise  Therapeutic Exercise Activity 1: recumbant bike: 5' seat at 25, seat 23 for last 3'  Therapeutic Exercise Activity 2: SLR: 20 then with ER 20 x  Therapeutic Exercise Activity 3: SAQ with 5\" hold: 20 x  Therapeutic Exercise Activity 4: Quad swet with Tband? 20 x 5\"  Therapeutic Exercise Activity 5: retro TM: 5' at 0.8 " mph    15 minutes  Therapeutic Activity  Therapeutic Activity Performed: Yes  Therapeutic Activity 1: Lenghty discussion on time walking over miles, active and rest versus continued activity and the measurement of flexion and extension with reduced focus on needed to stretch in to extension as often based upon 1 degree from full extension, patient also educated on the normal amounts of edema and what is problematic         Education:      Laz Armstrong, PT

## 2024-10-29 ENCOUNTER — TREATMENT (OUTPATIENT)
Dept: PHYSICAL THERAPY | Facility: CLINIC | Age: 29
End: 2024-10-29
Payer: COMMERCIAL

## 2024-10-29 DIAGNOSIS — M25.561 RIGHT KNEE PAIN: ICD-10-CM

## 2024-10-29 PROCEDURE — 97110 THERAPEUTIC EXERCISES: CPT | Mod: GP | Performed by: PHYSICAL THERAPIST

## 2024-10-29 ASSESSMENT — PAIN SCALES - GENERAL: PAINLEVEL_OUTOF10: 2

## 2024-10-29 ASSESSMENT — PAIN - FUNCTIONAL ASSESSMENT: PAIN_FUNCTIONAL_ASSESSMENT: 0-10

## 2024-11-05 ENCOUNTER — TREATMENT (OUTPATIENT)
Dept: PHYSICAL THERAPY | Facility: CLINIC | Age: 29
End: 2024-11-05
Payer: COMMERCIAL

## 2024-11-05 DIAGNOSIS — M25.561 RIGHT KNEE PAIN: ICD-10-CM

## 2024-11-05 PROCEDURE — 97110 THERAPEUTIC EXERCISES: CPT | Mod: GP | Performed by: PHYSICAL THERAPIST

## 2024-11-05 ASSESSMENT — PAIN - FUNCTIONAL ASSESSMENT: PAIN_FUNCTIONAL_ASSESSMENT: 0-10

## 2024-11-05 ASSESSMENT — PAIN SCALES - GENERAL: PAINLEVEL_OUTOF10: 0 - NO PAIN

## 2024-11-05 NOTE — PROGRESS NOTES
Physical Therapy  Physical Therapy Treatment    Patient Name: Taras Wesley  MRN: [id number]  Today's Date: 11/5/2024  Time Calculation  Start Time: 1100  Stop Time: 1139  Time Calculation (min): 39 min    General  Reason for Referral: R ACL reconstruction  Referred By: Henrry  General Comment: visit 7  Assessment:    Patient and PT discussed the proper way to ween from brace. Per SHILA Sales last appointment 10/14/2024, brace can be weened at this time. Patient has improved quad strength and overall is progressing well with post op protocol  Plan:  Progress as indicated by patient ability following post op protocol     Active       PT Problem       PT Goal 1       Start:  09/24/24    Expected End:  02/28/25            PT Goal 2       Start:  09/24/24    Expected End:  02/28/25            PT Goal 3       Start:  09/24/24    Expected End:  02/28/25            PT Goal 4       Start:  09/24/24    Expected End:  02/28/25            PT Goal 5       Start:  09/24/24    Expected End:  02/28/25            Patient Stated Goal 1       Start:  09/24/24    Expected End:  02/28/25                General  Chief Complaint:   1. Right knee pain  Follow Up In Physical Therapy          Subjective:     Current Problem  General  Reason for Referral: R ACL reconstruction  Referred By: Henrry  General Comment: visit 7    Precautions  Precautions Comment: post op week 9    Patient perform today's treatment with some muscle fatigue      Pain  Pain Assessment: 0-10  0-10 (Numeric) Pain Score: 0 - No pain  Pain Type: Surgical pain  Pain Location: Knee  Pain Orientation: Right    Objective:     Treatments:   This therapist instructed and demonstrated interventions to patient, patient completed the following under direct supervision of this therapist:    39 minutes  Therapeutic Exercise  Therapeutic Exercise Performed: Yes  Therapeutic Exercise Activity 1: Scifit: seat 12, level 1,8'  Therapeutic Exercise Activity 2: Retro TM:1.3 -1.5 mph:  "5'  Therapeutic Exercise Activity 3: wall slide with red Sball: 3 x 12; green Tband around thighs  Therapeutic Exercise Activity 4: Prone HS curl: 6# 3 x 20  Therapeutic Exercise Activity 5: Stool Scoots: 3 laps, push and pull  Therapeutic Exercise Activity 6: SLS: EO 2 x 20\", on red disc: 3 x 20\"     Education:      Laz Armstrong, PT  "

## 2024-11-13 ENCOUNTER — TREATMENT (OUTPATIENT)
Dept: PHYSICAL THERAPY | Facility: CLINIC | Age: 29
End: 2024-11-13
Payer: COMMERCIAL

## 2024-11-13 DIAGNOSIS — M25.561 RIGHT KNEE PAIN: ICD-10-CM

## 2024-11-13 PROCEDURE — 97110 THERAPEUTIC EXERCISES: CPT | Mod: GP | Performed by: PHYSICAL THERAPIST

## 2024-11-13 ASSESSMENT — PAIN - FUNCTIONAL ASSESSMENT: PAIN_FUNCTIONAL_ASSESSMENT: 0-10

## 2024-11-13 ASSESSMENT — PAIN SCALES - GENERAL: PAINLEVEL_OUTOF10: 0 - NO PAIN

## 2024-11-13 NOTE — PROGRESS NOTES
"    Physical Therapy  Physical Therapy Treatment    Patient Name: Taras Wesley  MRN: [id number]  Today's Date: 11/13/2024  Time Calculation  Start Time: 1530  Stop Time: 1610  Time Calculation (min): 40 min    General  Reason for Referral: R ACL reconstruction  Referred By: Henrry  General Comment: visit 8  Assessment:    Patient has been successful in weening from the brace, but does prefer to wear it during work activity, but overall feels he is progressing well.  Plan:  Continue to progress as tolerated     Active       PT Problem       PT Goal 1       Start:  09/24/24    Expected End:  02/28/25            PT Goal 2       Start:  09/24/24    Expected End:  02/28/25            PT Goal 3       Start:  09/24/24    Expected End:  02/28/25            PT Goal 4       Start:  09/24/24    Expected End:  02/28/25            PT Goal 5       Start:  09/24/24    Expected End:  02/28/25            Patient Stated Goal 1       Start:  09/24/24    Expected End:  02/28/25                General  Chief Complaint:   1. Right knee pain  Follow Up In Physical Therapy          Subjective:     Current Problem  General  Reason for Referral: R ACL reconstruction  Referred By: Henrry  General Comment: visit 8    Precautions  Precautions Comment: post op week 10    Patient perform today's treatment with fatigue but no soreness       Pain  Pain Assessment: 0-10  0-10 (Numeric) Pain Score: 0 - No pain  Pain Type: Surgical pain  Pain Location: Knee  Pain Orientation: Right    Objective:     Treatments:   This therapist instructed and demonstrated interventions to patient, patient completed the following under direct supervision of this therapist:    40 minutes  Therapeutic Exercise  Therapeutic Exercise Performed: Yes  Therapeutic Exercise Activity 1: Scifit: seat 12, level 1,8'  Therapeutic Exercise Activity 2: Retro TM:2.0 mph: 5'  Therapeutic Exercise Activity 3: wall slide with red Sball: 20 x 5\"; blue Tband around thighs  Therapeutic " "Exercise Activity 4: Standing HS curl: 6# 3 x 10  Therapeutic Exercise Activity 5: Stool Scoots:4 laps, push and pull  Therapeutic Exercise Activity 6: SLS: EO 2 x 20\", on blue disc: 3 x 20\"  Therapeutic Exercise Activity 7: SLS on blue disc: alternating forward reaches; cross body reaches: 10x R/L  Therapeutic Exercise Activity 8: Step march Right LE on step 30 x     Education:      Laz Armstrong, PT  "

## 2024-11-25 ENCOUNTER — APPOINTMENT (OUTPATIENT)
Dept: ORTHOPEDIC SURGERY | Facility: CLINIC | Age: 29
End: 2024-11-25
Payer: COMMERCIAL

## (undated) DEVICE — DRAPE, SHEET, U, STERI DRAPE, 47 X 51 IN, DISPOSABLE, STERILE

## (undated) DEVICE — BUR, GREAT WHITE, 4.2MM

## (undated) DEVICE — BANDAGE, COFLEX, 6 X 5 YDS, FOAM TAN, STERILE, LF

## (undated) DEVICE — SOLUTION, IRRI GATION, LACTATED RINGERS, 3000 ML, BAG

## (undated) DEVICE — NEEDLE, SPINAL, QUINCKE, 18 G X 3.5 IN, PINK HUB

## (undated) DEVICE — SUTURE, FIBERWIRE 2, T-5 TAPER NEEDLE, 38"

## (undated) DEVICE — SUTURE, HI-FI, SZ 2, WHITE BRAIDED, HC-5 1/2 CIRCLE TAPER

## (undated) DEVICE — SUTURE, PROLENE, 3-0, 18 IN, PS1, BLUE

## (undated) DEVICE — MAT, FLOOR, SUCTION, LOW PROFILE, 50 X 34

## (undated) DEVICE — SUTURE, ETHILON, 3-0, 18 IN, PS1, BLACK

## (undated) DEVICE — COUNTER, NEEDLE, FOAM STRIP, DOUBLE, W/BLADEGUARD, 30 COUNT

## (undated) DEVICE — CUFF, TOURNIQUET, 30 X 4, SNGL PORT/SNGL BLADDER, DISP, LF

## (undated) DEVICE — TUBING, ARTHROSCOPIC INFLOW, 10K

## (undated) DEVICE — DRAPE, TIBURON, SPLIT SHEET, REINF ADH STRIP, 77X122

## (undated) DEVICE — SUTURE, CTD, VICRYL, 2-0, UND, BR, CT-2

## (undated) DEVICE — STRIP, SKIN CLOSURE, STERI STRIP, REINFORCED, 0.5 X 4 IN

## (undated) DEVICE — BLADE, SAW, SAGITTAL, MICRO, FLAT, COARSE, 9.5 X 25.5 X 0.4 MM, STAINLESS STEEL, STERILE

## (undated) DEVICE — BANDAGE, ELASTIC,  6 IN X 11 YDS, STERILE, LF

## (undated) DEVICE — Device

## (undated) DEVICE — SUTURE, VICRYL, 0, 27 IN, CT-2, UNDYED

## (undated) DEVICE — DRAPE PACK, TOTAL KNEE, CUSTOM, GEAUGA

## (undated) DEVICE — TUBING, SUCTION, CONNECTING, NON-CONDUCTIVE, SURE GRIP CONNECTORS, 3/16 IN X 10 FT

## (undated) DEVICE — KIT, MINOR, DOUBLE BASIN